# Patient Record
Sex: MALE | Race: WHITE | NOT HISPANIC OR LATINO | Employment: FULL TIME | URBAN - METROPOLITAN AREA
[De-identification: names, ages, dates, MRNs, and addresses within clinical notes are randomized per-mention and may not be internally consistent; named-entity substitution may affect disease eponyms.]

---

## 2023-10-17 ENCOUNTER — HOSPITAL ENCOUNTER (EMERGENCY)
Facility: HOSPITAL | Age: 33
Discharge: LEFT BEFORE DISCHARGE | End: 2023-10-17
Payer: COMMERCIAL

## 2023-10-17 VITALS
BODY MASS INDEX: 19.36 KG/M2 | WEIGHT: 130.73 LBS | DIASTOLIC BLOOD PRESSURE: 76 MMHG | SYSTOLIC BLOOD PRESSURE: 119 MMHG | TEMPERATURE: 97.2 F | OXYGEN SATURATION: 99 % | HEART RATE: 78 BPM | RESPIRATION RATE: 18 BRPM | HEIGHT: 69 IN

## 2023-10-17 PROCEDURE — 99211 OFF/OP EST MAY X REQ PHY/QHP: CPT

## 2024-01-25 ENCOUNTER — HOSPITAL ENCOUNTER (EMERGENCY)
Facility: HOSPITAL | Age: 34
Discharge: HOME | End: 2024-01-25
Attending: EMERGENCY MEDICINE
Payer: COMMERCIAL

## 2024-01-25 VITALS
TEMPERATURE: 98.5 F | BODY MASS INDEX: 21.18 KG/M2 | WEIGHT: 147.93 LBS | RESPIRATION RATE: 17 BRPM | HEART RATE: 69 BPM | OXYGEN SATURATION: 100 % | SYSTOLIC BLOOD PRESSURE: 128 MMHG | DIASTOLIC BLOOD PRESSURE: 93 MMHG | HEIGHT: 70 IN

## 2024-01-25 DIAGNOSIS — S01.311A LACERATION OF RIGHT EAR LOBE, INITIAL ENCOUNTER: Primary | ICD-10-CM

## 2024-01-25 PROCEDURE — 99282 EMERGENCY DEPT VISIT SF MDM: CPT

## 2024-01-25 NOTE — DISCHARGE INSTRUCTIONS
Sutures are absorbable.    Follow-up with plastic surgery as needed if wound is not healing or if you need revision.    Tylenol Motrin for pain.    Return for new or worsening symptoms

## 2024-01-25 NOTE — ED PROVIDER NOTES
Time: 1:55 AM EST  Date of encounter:  1/25/2024  Independent Historian/Clinical History and Information was obtained by:   Patient and Family    History is limited by: N/A    Chief Complaint: Ear laceration      History of Present Illness:  Patient is a 33 y.o. year old male who presents to the emergency department for evaluation of ear laceration.  Patient was trying to make his irrigate hole wider when the earlobe ripped apart at the bottom.  Patient is allergic to tetanus and cannot take it.  No other issues.  Patient is not on blood thinners    HPI    Patient Care Team  Primary Care Provider: Provider, Paulette Known    Past Medical History:     No Known Allergies  History reviewed. No pertinent past medical history.  Past Surgical History:   Procedure Laterality Date    FRACTURE SURGERY      HIP SURGERY Right      History reviewed. No pertinent family history.    Home Medications:  Prior to Admission medications    Medication Sig Start Date End Date Taking? Authorizing Provider   buprenorphine-naloxone (SUBOXONE) 8-2 MG film film PLACE 2 & 3/4 films UNDER THE TONGUE EVERY DAY 5/18/23   Juana Coronado MD   gabapentin (NEURONTIN) 600 MG tablet TAKE 1 TABLET BY MOUTH THREE TIMES DAILY. may take an additional TABLET AS NEEDED for uncontrolled pain 5/18/23   Juana Coronado MD   sertraline (ZOLOFT) 100 MG tablet Take 100 mg by mouth Daily.    Emergency, Nurse Brii, RN        Social History:   Social History     Tobacco Use    Smoking status: Every Day     Packs/day: 1     Types: Cigarettes    Smokeless tobacco: Never   Vaping Use    Vaping Use: Never used   Substance Use Topics    Alcohol use: Never    Drug use: Not Currently         Review of Systems:  Review of Systems   Skin:  Positive for wound (Earlobe laceration).   Neurological:  Negative for headaches.   Hematological: Negative.  Does not bruise/bleed easily.   Psychiatric/Behavioral: Negative.     All other systems reviewed and are negative.    "    Physical Exam:  /93   Pulse 69   Temp 98.5 °F (36.9 °C) (Oral)   Resp 17   Ht 177.8 cm (70\")   Wt 67.1 kg (147 lb 14.9 oz)   SpO2 100%   BMI 21.23 kg/m²     Physical Exam  Vitals and nursing note reviewed.   Constitutional:       Appearance: Normal appearance.   HENT:      Head: Atraumatic.      Right Ear: Tympanic membrane and ear canal normal.      Ears:      Comments: Opening of right earlobe hole is about nickel sized from being gauged out over time.  The distal aspect of the skin has split in half.  It is approximately 5 mm thick     Nose: Nose normal.   Eyes:      Conjunctiva/sclera: Conjunctivae normal.   Pulmonary:      Effort: Pulmonary effort is normal.   Musculoskeletal:         General: Normal range of motion.      Cervical back: Normal range of motion.   Skin:     Comments: Distal part of gauged earlobe is split in the middle.  Mild bleeding   Neurological:      General: No focal deficit present.      Mental Status: He is alert.   Psychiatric:         Mood and Affect: Mood normal.         Behavior: Behavior normal.                Procedures:  Laceration Repair    Date/Time: 1/25/2024 2:03 AM    Performed by: Sherrie Person APRN  Authorized by: Mendoza Randolph MD    Consent:     Consent obtained:  Verbal    Consent given by:  Patient    Risks, benefits, and alternatives were discussed: yes      Risks discussed:  Infection, pain, poor cosmetic result, poor wound healing and need for additional repair    Alternatives discussed:  No treatment  Universal protocol:     Procedure explained and questions answered to patient or proxy's satisfaction: yes      Patient identity confirmed:  Verbally with patient  Anesthesia:     Anesthesia method:  Local infiltration    Local anesthetic:  Lidocaine 1% w/o epi  Laceration details:     Location:  Ear    Ear location:  R ear    Length (cm):  0.5    Depth (mm):  0.5 (Entire lower aspect of earlobe split through)  Pre-procedure details:     Preparation:  " Patient was prepped and draped in usual sterile fashion  Exploration:     Imaging outcome: foreign body not noted      Wound exploration: entire depth of wound visualized      Contaminated: no    Treatment:     Area cleansed with:  Povidone-iodine    Amount of cleaning:  Standard    Irrigation solution:  Sterile saline    Irrigation volume:  50    Irrigation method:  Syringe  Skin repair:     Repair method:  Sutures    Suture size:  4-0    Suture material:  Fast-absorbing gut    Suture technique:  Simple interrupted    Number of sutures:  4 (Circumferential at each aspect to tack 2 pieces back together)  Approximation:     Approximation:  Close  Repair type:     Repair type:  Simple  Post-procedure details:     Dressing:  Sterile dressing    Procedure completion:  Tolerated well, no immediate complications        Medical Decision Making:      Comorbidities that affect care:    Smoking    External Notes reviewed:    Previous ED Note: Last visit was back in October for headache      The following orders were placed and all results were independently analyzed by me:  Orders Placed This Encounter   Procedures    Laceration Repair       Medications Given in the Emergency Department:  Medications - No data to display     ED Course:         Labs:    Lab Results (last 24 hours)       ** No results found for the last 24 hours. **             Imaging:    No Radiology Exams Resulted Within Past 24 Hours      Differential Diagnosis and Discussion:    Laceration: Laceration was evaluated for arterial injury, ligamentous damage, and other neurovascular injury.        MDM  Number of Diagnoses or Management Options  Laceration of right ear lobe, initial encounter  Diagnosis management comments: The patient presented with a laceration in need of repair. See laceration repair note for details. The wound was irrigated with normal saline irrigation. 4 absorbable sutures were used to approximate the wound edges. Tetanus was not given.   Patient states he is allergic the patient tolerated the procedure well. Acute bleeding has ceased and the wound was approximated in the emergency department. Patient was counseled to keep the wound clean, dry, and out of the sun. Patient was counseled to change dressings daily. Patient was advised to return to the ED for worsening erythema, pain, swelling, fever, excessive drainage or signs of infection. They were counseled to follow up for suture removal as described in the discharge instructions. Patient verbalizes understanding and agrees to follow up as instructed.       Amount and/or Complexity of Data Reviewed  Tests in the medicine section of CPT®: ordered and reviewed    Risk of Complications, Morbidity, and/or Mortality  Presenting problems: low  Management options: low    Patient Progress  Patient progress: stable           Patient Care Considerations:    ANTIBIOTICS: I considered prescribing antibiotics as an outpatient however no bacterial focus of infection was found.      Consultants/Shared Management Plan:    None    Social Determinants of Health:    Patient has presented with family members who are responsible, reliable and will ensure follow up care.      Disposition and Care Coordination:    Discharged: The patient is suitable and stable for discharge with no need for consideration of admission.    I have explained the patient´s condition, diagnoses and treatment plan based on the information available to me at this time. I have answered questions and addressed any concerns. The patient has a good  understanding of the patient´s diagnosis, condition, and treatment plan as can be expected at this point. The vital signs have been stable. The patient´s condition is stable and appropriate for discharge from the emergency department.      The patient will pursue further outpatient evaluation with the primary care physician or other designated or consulting physician as outlined in the discharge  instructions. They are agreeable to this plan of care and follow-up instructions have been explained in detail. The patient has received these instructions in written format and have expressed an understanding of the discharge instructions. The patient is aware that any significant change in condition or worsening of symptoms should prompt an immediate return to this or the closest emergency department or call to 911.    Final diagnoses:   Laceration of right ear lobe, initial encounter        ED Disposition       ED Disposition   Discharge    Condition   Stable    Comment   --               This medical record created using voice recognition software.             Sherrie Person, APRN  01/25/24 [id number]

## 2024-02-16 ENCOUNTER — HOSPITAL ENCOUNTER (OUTPATIENT)
Facility: HOSPITAL | Age: 34
Setting detail: OBSERVATION
Discharge: HOME | End: 2024-02-17
Attending: EMERGENCY MEDICINE | Admitting: INTERNAL MEDICINE
Payer: COMMERCIAL

## 2024-02-16 ENCOUNTER — APPOINTMENT (OUTPATIENT)
Dept: MRI IMAGING | Facility: HOSPITAL | Age: 34
End: 2024-02-16
Payer: COMMERCIAL

## 2024-02-16 ENCOUNTER — APPOINTMENT (OUTPATIENT)
Dept: CT IMAGING | Facility: HOSPITAL | Age: 34
End: 2024-02-16
Payer: COMMERCIAL

## 2024-02-16 DIAGNOSIS — R56.9 SEIZURE: Primary | ICD-10-CM

## 2024-02-16 LAB
ALBUMIN SERPL-MCNC: 4.6 G/DL (ref 3.5–5.2)
ALBUMIN/GLOB SERPL: 1.6 G/DL
ALP SERPL-CCNC: 97 U/L (ref 39–117)
ALT SERPL W P-5'-P-CCNC: 12 U/L (ref 1–41)
ANION GAP SERPL CALCULATED.3IONS-SCNC: 20.7 MMOL/L (ref 5–15)
AST SERPL-CCNC: 17 U/L (ref 1–40)
BASOPHILS # BLD AUTO: 0.02 10*3/MM3 (ref 0–0.2)
BASOPHILS NFR BLD AUTO: 0.1 % (ref 0–1.5)
BILIRUB SERPL-MCNC: 0.2 MG/DL (ref 0–1.2)
BUN SERPL-MCNC: 18 MG/DL (ref 6–20)
BUN/CREAT SERPL: 17.5 (ref 7–25)
CALCIUM SPEC-SCNC: 10.4 MG/DL (ref 8.6–10.5)
CHLORIDE SERPL-SCNC: 98 MMOL/L (ref 98–107)
CK SERPL-CCNC: 85 U/L (ref 20–200)
CO2 SERPL-SCNC: 18.3 MMOL/L (ref 22–29)
CREAT SERPL-MCNC: 1.03 MG/DL (ref 0.76–1.27)
DEPRECATED RDW RBC AUTO: 44.8 FL (ref 37–54)
EGFRCR SERPLBLD CKD-EPI 2021: 97.1 ML/MIN/1.73
EOSINOPHIL # BLD AUTO: 0.09 10*3/MM3 (ref 0–0.4)
EOSINOPHIL NFR BLD AUTO: 0.7 % (ref 0.3–6.2)
ERYTHROCYTE [DISTWIDTH] IN BLOOD BY AUTOMATED COUNT: 12.5 % (ref 12.3–15.4)
GLOBULIN UR ELPH-MCNC: 2.8 GM/DL
GLUCOSE BLDC GLUCOMTR-MCNC: 160 MG/DL (ref 70–99)
GLUCOSE SERPL-MCNC: 181 MG/DL (ref 65–99)
HCT VFR BLD AUTO: 42.1 % (ref 37.5–51)
HGB BLD-MCNC: 14.2 G/DL (ref 13–17.7)
HOLD SPECIMEN: 11
HOLD SPECIMEN: 11
IMM GRANULOCYTES # BLD AUTO: 0.07 10*3/MM3 (ref 0–0.05)
IMM GRANULOCYTES NFR BLD AUTO: 0.5 % (ref 0–0.5)
LYMPHOCYTES # BLD AUTO: 2.08 10*3/MM3 (ref 0.7–3.1)
LYMPHOCYTES NFR BLD AUTO: 15.1 % (ref 19.6–45.3)
MAGNESIUM SERPL-MCNC: 2.6 MG/DL (ref 1.6–2.6)
MCH RBC QN AUTO: 33.4 PG (ref 26.6–33)
MCHC RBC AUTO-ENTMCNC: 33.7 G/DL (ref 31.5–35.7)
MCV RBC AUTO: 99.1 FL (ref 79–97)
MONOCYTES # BLD AUTO: 1.07 10*3/MM3 (ref 0.1–0.9)
MONOCYTES NFR BLD AUTO: 7.8 % (ref 5–12)
NEUTROPHILS NFR BLD AUTO: 10.4 10*3/MM3 (ref 1.7–7)
NEUTROPHILS NFR BLD AUTO: 75.8 % (ref 42.7–76)
NRBC BLD AUTO-RTO: 0 /100 WBC (ref 0–0.2)
PLATELET # BLD AUTO: 256 10*3/MM3 (ref 140–450)
PMV BLD AUTO: 9 FL (ref 6–12)
POTASSIUM SERPL-SCNC: 3.5 MMOL/L (ref 3.5–5.2)
PROT SERPL-MCNC: 7.4 G/DL (ref 6–8.5)
RBC # BLD AUTO: 4.25 10*6/MM3 (ref 4.14–5.8)
SODIUM SERPL-SCNC: 137 MMOL/L (ref 136–145)
TSH SERPL DL<=0.05 MIU/L-ACNC: 2.37 UIU/ML (ref 0.27–4.2)
WBC NRBC COR # BLD AUTO: 13.73 10*3/MM3 (ref 3.4–10.8)
WHOLE BLOOD HOLD COAG: 11
WHOLE BLOOD HOLD SPECIMEN: 11

## 2024-02-16 PROCEDURE — 99222 1ST HOSP IP/OBS MODERATE 55: CPT | Performed by: INTERNAL MEDICINE

## 2024-02-16 PROCEDURE — G0378 HOSPITAL OBSERVATION PER HR: HCPCS

## 2024-02-16 PROCEDURE — 25810000003 LACTATED RINGERS SOLUTION: Performed by: EMERGENCY MEDICINE

## 2024-02-16 PROCEDURE — 82550 ASSAY OF CK (CPK): CPT | Performed by: EMERGENCY MEDICINE

## 2024-02-16 PROCEDURE — 80050 GENERAL HEALTH PANEL: CPT | Performed by: EMERGENCY MEDICINE

## 2024-02-16 PROCEDURE — 0 GADOBENATE DIMEGLUMINE 529 MG/ML SOLUTION: Performed by: INTERNAL MEDICINE

## 2024-02-16 PROCEDURE — 99291 CRITICAL CARE FIRST HOUR: CPT

## 2024-02-16 PROCEDURE — 25010000002 LEVETIRACETAM IN NACL 0.75% 1000 MG/100ML SOLUTION: Performed by: EMERGENCY MEDICINE

## 2024-02-16 PROCEDURE — A9577 INJ MULTIHANCE: HCPCS | Performed by: INTERNAL MEDICINE

## 2024-02-16 PROCEDURE — 70450 CT HEAD/BRAIN W/O DYE: CPT

## 2024-02-16 PROCEDURE — 82948 REAGENT STRIP/BLOOD GLUCOSE: CPT

## 2024-02-16 PROCEDURE — 96374 THER/PROPH/DIAG INJ IV PUSH: CPT

## 2024-02-16 PROCEDURE — 99204 OFFICE O/P NEW MOD 45 MIN: CPT

## 2024-02-16 PROCEDURE — 83735 ASSAY OF MAGNESIUM: CPT | Performed by: EMERGENCY MEDICINE

## 2024-02-16 PROCEDURE — 70553 MRI BRAIN STEM W/O & W/DYE: CPT

## 2024-02-16 RX ORDER — OXCARBAZEPINE 600 MG/1
1 TABLET, FILM COATED ORAL EVERY 12 HOURS SCHEDULED
COMMUNITY
Start: 2024-02-08

## 2024-02-16 RX ORDER — HYDROCODONE BITARTRATE AND ACETAMINOPHEN 5; 325 MG/1; MG/1
1 TABLET ORAL EVERY 6 HOURS PRN
COMMUNITY
Start: 2024-02-13 | End: 2024-02-17 | Stop reason: HOSPADM

## 2024-02-16 RX ORDER — NITROGLYCERIN 0.4 MG/1
0.4 TABLET SUBLINGUAL
Status: DISCONTINUED | OUTPATIENT
Start: 2024-02-16 | End: 2024-02-17 | Stop reason: HOSPADM

## 2024-02-16 RX ORDER — ENOXAPARIN SODIUM 100 MG/ML
40 INJECTION SUBCUTANEOUS EVERY 24 HOURS
Status: DISCONTINUED | OUTPATIENT
Start: 2024-02-16 | End: 2024-02-17 | Stop reason: HOSPADM

## 2024-02-16 RX ORDER — SODIUM CHLORIDE 0.9 % (FLUSH) 0.9 %
10 SYRINGE (ML) INJECTION AS NEEDED
Status: DISCONTINUED | OUTPATIENT
Start: 2024-02-16 | End: 2024-02-17 | Stop reason: HOSPADM

## 2024-02-16 RX ORDER — BUPRENORPHINE HYDROCHLORIDE AND NALOXONE HYDROCHLORIDE DIHYDRATE 8; 2 MG/1; MG/1
1 TABLET SUBLINGUAL 2 TIMES DAILY
Status: DISCONTINUED | OUTPATIENT
Start: 2024-02-16 | End: 2024-02-17 | Stop reason: HOSPADM

## 2024-02-16 RX ORDER — OXCARBAZEPINE 300 MG/1
600 TABLET, FILM COATED ORAL EVERY 12 HOURS SCHEDULED
Status: DISCONTINUED | OUTPATIENT
Start: 2024-02-16 | End: 2024-02-17 | Stop reason: HOSPADM

## 2024-02-16 RX ORDER — BISACODYL 10 MG
10 SUPPOSITORY, RECTAL RECTAL DAILY PRN
Status: DISCONTINUED | OUTPATIENT
Start: 2024-02-16 | End: 2024-02-17 | Stop reason: HOSPADM

## 2024-02-16 RX ORDER — ACETAMINOPHEN 325 MG/1
650 TABLET ORAL EVERY 6 HOURS PRN
COMMUNITY

## 2024-02-16 RX ORDER — LEVETIRACETAM 500 MG/1
500 TABLET ORAL EVERY 12 HOURS SCHEDULED
Status: DISCONTINUED | OUTPATIENT
Start: 2024-02-16 | End: 2024-02-17 | Stop reason: HOSPADM

## 2024-02-16 RX ORDER — IBUPROFEN 400 MG/1
800 TABLET ORAL EVERY 8 HOURS PRN
Status: DISCONTINUED | OUTPATIENT
Start: 2024-02-16 | End: 2024-02-17 | Stop reason: HOSPADM

## 2024-02-16 RX ORDER — ACETAMINOPHEN 500 MG
1000 TABLET ORAL EVERY 8 HOURS PRN
Status: DISCONTINUED | OUTPATIENT
Start: 2024-02-16 | End: 2024-02-17 | Stop reason: HOSPADM

## 2024-02-16 RX ORDER — POLYETHYLENE GLYCOL 3350 17 G/17G
17 POWDER, FOR SOLUTION ORAL DAILY PRN
Status: DISCONTINUED | OUTPATIENT
Start: 2024-02-16 | End: 2024-02-17 | Stop reason: HOSPADM

## 2024-02-16 RX ORDER — CHOLECALCIFEROL (VITAMIN D3) 125 MCG
5 CAPSULE ORAL NIGHTLY PRN
Status: DISCONTINUED | OUTPATIENT
Start: 2024-02-16 | End: 2024-02-17 | Stop reason: HOSPADM

## 2024-02-16 RX ORDER — IBUPROFEN 800 MG/1
800 TABLET ORAL EVERY 8 HOURS PRN
COMMUNITY
Start: 2024-02-13

## 2024-02-16 RX ORDER — AMOXICILLIN 250 MG
2 CAPSULE ORAL 2 TIMES DAILY PRN
Status: DISCONTINUED | OUTPATIENT
Start: 2024-02-16 | End: 2024-02-17 | Stop reason: HOSPADM

## 2024-02-16 RX ORDER — GABAPENTIN 400 MG/1
800 CAPSULE ORAL EVERY 8 HOURS SCHEDULED
Status: DISCONTINUED | OUTPATIENT
Start: 2024-02-16 | End: 2024-02-17 | Stop reason: HOSPADM

## 2024-02-16 RX ORDER — BISACODYL 5 MG/1
5 TABLET, DELAYED RELEASE ORAL DAILY PRN
Status: DISCONTINUED | OUTPATIENT
Start: 2024-02-16 | End: 2024-02-17 | Stop reason: HOSPADM

## 2024-02-16 RX ORDER — AZITHROMYCIN 250 MG/1
250 TABLET, FILM COATED ORAL TAKE AS DIRECTED
COMMUNITY
Start: 2024-02-13

## 2024-02-16 RX ORDER — ONDANSETRON 4 MG/1
4 TABLET, ORALLY DISINTEGRATING ORAL EVERY 6 HOURS PRN
Status: DISCONTINUED | OUTPATIENT
Start: 2024-02-16 | End: 2024-02-17 | Stop reason: HOSPADM

## 2024-02-16 RX ORDER — SODIUM CHLORIDE 9 MG/ML
40 INJECTION, SOLUTION INTRAVENOUS AS NEEDED
Status: DISCONTINUED | OUTPATIENT
Start: 2024-02-16 | End: 2024-02-17 | Stop reason: HOSPADM

## 2024-02-16 RX ORDER — SODIUM CHLORIDE 0.9 % (FLUSH) 0.9 %
10 SYRINGE (ML) INJECTION EVERY 12 HOURS SCHEDULED
Status: DISCONTINUED | OUTPATIENT
Start: 2024-02-16 | End: 2024-02-17 | Stop reason: HOSPADM

## 2024-02-16 RX ORDER — LEVETIRACETAM 10 MG/ML
1000 INJECTION INTRAVASCULAR ONCE
Status: COMPLETED | OUTPATIENT
Start: 2024-02-16 | End: 2024-02-16

## 2024-02-16 RX ADMIN — IBUPROFEN 800 MG: 400 TABLET ORAL at 21:12

## 2024-02-16 RX ADMIN — Medication 10 ML: at 20:44

## 2024-02-16 RX ADMIN — SODIUM CHLORIDE, POTASSIUM CHLORIDE, SODIUM LACTATE AND CALCIUM CHLORIDE 500 ML: 600; 310; 30; 20 INJECTION, SOLUTION INTRAVENOUS at 11:07

## 2024-02-16 RX ADMIN — OXCARBAZEPINE 600 MG: 300 TABLET, FILM COATED ORAL at 20:45

## 2024-02-16 RX ADMIN — GABAPENTIN 800 MG: 400 CAPSULE ORAL at 21:12

## 2024-02-16 RX ADMIN — GADOBENATE DIMEGLUMINE 10 ML: 529 INJECTION, SOLUTION INTRAVENOUS at 19:33

## 2024-02-16 RX ADMIN — BUPRENORPHINE AND NALOXONE 1 TABLET: 8; 2 TABLET SUBLINGUAL at 20:45

## 2024-02-16 RX ADMIN — LEVETIRACETAM 500 MG: 500 TABLET, FILM COATED ORAL at 20:45

## 2024-02-16 RX ADMIN — LEVETIRACETAM 1000 MG: 10 INJECTION, SOLUTION INTRAVENOUS at 11:07

## 2024-02-16 NOTE — H&P
[facility]   HOSPITALIST HISTORY AND PHYSICAL  Date: 2024   Patient Name: Christian Hercules  : 1990  MRN: [id number]  Primary Care Physician:  Provider, No Known  Date of admission: 2024    Subjective   Subjective     Chief Complaint: New onset seizure    HPI:    Christian Hercules is a 33 y.o. male with past medical history significant for bipolar disorder, previous substance abuse who has been sober for 3 years on Suboxone and chronic pain on gabapentin who presents emergency department after he was witnessed by his girlfriend to have had a seizure.  She is currently not available to describe that event but per the documentation it was reported as a tonic-clonic seizure with foaming at the mouth and apnea.  EMS was called the patient was brought to the emergency department.  On arrival to the ED he was noted to be postictal.  While in the ED the patient underwent a CT of the brain and while in CT he had a witnessed generalized tonic-clonic seizure with subsequent postictal state.  He received 1 g of IV Keppra.  Teleneurology was consulted from the emergency department and recommendations were made to initiate the patient on Keppra 500 mg twice daily and admission to the hospitalist service for further valuation.    The patient does not recall the events which led to his admission.  He denies any previous history of seizures.  He denies any headache no recent visual changes no fevers no chills.      Personal History     Past Medical History:  Substance abuse history currently on Suboxone  Chronic pain  Bipolar disorder    Past Surgical History:  Past Surgical History:   Procedure Laterality Date    FRACTURE SURGERY      HIP SURGERY Right        Family History:   History reviewed. No pertinent family history.  No history of seizures.    Social History:   Social History     Socioeconomic History    Marital status: Single   Tobacco Use    Smoking status: Every Day     Packs/day: 1     Types: Cigarettes     Smokeless tobacco: Never   Vaping Use    Vaping Use: Never used   Substance and Sexual Activity    Alcohol use: Never    Drug use: Not Currently       Home Medications:  buprenorphine-naloxone, gabapentin, and sertraline    Allergies:  No Known Allergies    Review of Systems   All systems were reviewed and negative except for: As noted in PI.    Objective   Objective     Vitals:   Temp:  [97.9 °F (36.6 °C)] 97.9 °F (36.6 °C)  Heart Rate:  [75-93] 76  Resp:  [16] 16  BP: (108-124)/(52-70) 124/67    Physical Exam    Constitutional: Awake, alert, no acute distress   Eyes: Pupils equally round and reactive, sclerae anicteric, no conjunctival injection   HENT: NCAT, mucous membranes moist   Neck: Supple, no thyromegaly, no lymphadenopathy, trachea midline   Respiratory: Clear to auscultation bilaterally, nonlabored respirations    Cardiovascular: RRR, no appreciable murmurs, palpable pedal pulses bilaterally   Gastrointestinal: Positive bowel sounds, soft, nontender, nondistended   Musculoskeletal: No bilateral ankle edema, no clubbing or cyanosis to extremities   Psychiatric: Appropriate affect, cooperative   Neurologic: Oriented x 3, no extremities equally-no focal weakness, Cranial Nerves grossly intact to confrontation, speech clear   Skin: No rashes     Result Review    Result Review:  I have personally reviewed the results from the time of this admission to 2/16/2024 14:51 EST and agree with these findings:  [x]  Laboratory  []  Microbiology  [x]  Radiology  []  EKG/Telemetry   []  Cardiology/Vascular   []  Pathology  []  Old records  []  Other:      Assessment & Plan   Assessment / Plan     Assessment/Plan:   New-onset seizure  History of substance abuse  Chronic pain syndrome  Bipolar disorder    Admit to the hospitalist service for further evaluation.  Continue Keppra 500 mg p.o. twice daily as per recommendations of neurology.  We will obtain an MRI of the brain for further evaluation.  Patient will need  outpatient follow-up with neurology and outpatient EEG arranged for further evaluation as well.  Seizure precautions have been stressed to the patient and his mother who is at bedside.    DVT prophylaxis:  Medical DVT prophylaxis orders are present.        CODE STATUS:    Code Status (Patient has no pulse and is not breathing): CPR (Attempt to Resuscitate)  Medical Interventions (Patient has pulse or is breathing): Full Support      Admission Status:  I believe this patient meets observation status.    Electronically signed by Lucrecia Navarro MD, 02/16/24, 2:51 PM EST.

## 2024-02-16 NOTE — PAYOR COMM NOTE
"Elaine Nagy (33 y.o. Male)     PATIENT INFORMATION  Name:  Elaine Nagy  MRN#:     [id number]  :  1990       ADMISSION INFORMATION  CLASS: Observation   DOS:  24      CURRENT ATTENDING PROVIDER INFORMATION  Name/NPI: Lucrecia Navarro MD (NPI: [id number])   Phone:  Phone: [phone]      RENDERING FACILITY  Name:  [facility]   NPI:  [id number]  TID:  [id number]  Address:      [facility] [address] [facility]  Phone  [phone]    UTILIZATION REVIEW CONTACT INFORMATION  Phone:      [phone]  Fax:           [phone]      ADMISSION DIAGNOSIS  Seizure [R56.9]  ++++++++++++++++++++++++++++++++++++++++++++++++++++++++++++++++++++++++++++++++        Date of Birth   1990    Social Security Number       Address   [address] [address]    Home Phone   [phone]    MRN   [id number]       [employer]    Marital Status   Single                            Admission Date   24    Admission Type   Emergency    Admitting Provider       Attending Provider   Kade Liu DO    Department, Room/Bed   [facility] EMERGENCY ROOM,        Discharge Date       Discharge Disposition       Discharge Destination                                 Attending Provider: Kade Liu DO    Allergies: No Known Allergies    Isolation: None   Infection: None   Code Status: CPR    Ht: 177.8 cm (70\")   Wt: 66 kg (145 lb 8.1 oz)    Admission Cmt: None   Principal Problem: Seizure [R56.9]                   Active Insurance as of 2024       Primary Coverage       Payor Plan Insurance Group Employer/Plan Group    [employer] [place] [employer] MEDICA[place]        Payor Plan Address Payor Plan Phone Number Payor Plan Fax Number Effective Dates    [address] [address] [phone]  2023 - None Entered    [facility] [id number]         Subscriber Name Subscriber Birth Date Member ID       ELAINE NAGY 1990 [id number]                      Seizure [facility] In[facility]     " "  Indications Met   Last updated by Viky Oliver RN on 2/16/2024 1602     Review Status Created By   Primary Completed Viky Oliver RN      Criteria Review   Seizure [facility] In[facility]     Overall Determination: Indications Met     Criteria:  [×] Admission is indicated for  1 or more  of the following :      [×] Recurrent seizure (ie, during emergency department or observation care) and  1 or more  of the following :          [×] Patient not known to have seizure disorder              2/16/2024  4:02 PM                  -- 2/16/2024  4:02 PM by Viky Oliver, MALI --                      No previous hx of seizures.                       Sig Other witnessed tonic/clonic sz lasting approx 2 min. EMS reports pt post-ictal when they arrived.                       + Tonic clonic sz again while in CT. Witnessed by hospital staff.     Notes:  -- 2/16/2024  4:02 PM by Viky Oliver RN --      To ED via EMS s/p seizure. No previous hx of seizures. Was witnessed by girlfriend. Lasted approx 2 minutes. Was \"foaming at the mouth\" & period of apnea. EMS responded & reports pt post-ictal on their arrival.       Had 2nd seizure while in CT. Witnessed by hospital staff. generalized tonic clonic seizure w/subsequent post-ictal state.       Pt denies previous hx of seizure. Denies headache or visual changes. No fever or chills.                   PMHx: JODY (on suboxone); Bipolar disorder. Hip surgery.                   ED results: CTH: No acute intracranial findings. Ac on Chronic paranasal sinus disease.       Anion gap 20.7; serum CO2 18.3; WBC 13.73.                   In ED: Keppra 1gm IV; IV LR 500ml bolus.                   Admit: Consult Neurology; Seizure precautions; O2; MRI brain; UDS; CBC in am; Keppra PO q12h; Lovenox SQ qd;.                                  History & Physical        Lucrecia Navarro MD at 02/16/24 [facility]           [facility] HISTORY AND PHYSICAL  Date: 2/16/2024   Patient Name: " Christian Hercules  : 1990  MRN: [id number]  Primary Care Physician:  Provider, No Known  Date of admission: 2024    Subjective  Subjective     Chief Complaint: New onset seizure    HPI:    Christian Hercules is a 33 y.o. male with past medical history significant for bipolar disorder, previous substance abuse who has been sober for 3 years on Suboxone and chronic pain on gabapentin who presents emergency department after he was witnessed by his girlfriend to have had a seizure.  She is currently not available to describe that event but per the documentation it was reported as a tonic-clonic seizure with foaming at the mouth and apnea.  EMS was called the patient was brought to the emergency department.  On arrival to the ED he was noted to be postictal.  While in the ED the patient underwent a CT of the brain and while in CT he had a witnessed generalized tonic-clonic seizure with subsequent postictal state.  He received 1 g of IV Keppra.  Teleneurology was consulted from the emergency department and recommendations were made to initiate the patient on Keppra 500 mg twice daily and admission to the hospitalist service for further valuation.    The patient does not recall the events which led to his admission.  He denies any previous history of seizures.  He denies any headache no recent visual changes no fevers no chills.      Personal History     Past Medical History:  Substance abuse history currently on Suboxone  Chronic pain  Bipolar disorder    Past Surgical History:  Past Surgical History:   Procedure Laterality Date    FRACTURE SURGERY      HIP SURGERY Right        Family History:   History reviewed. No pertinent family history.  No history of seizures.    Social History:   Social History     Socioeconomic History    Marital status: Single   Tobacco Use    Smoking status: Every Day     Packs/day: 1     Types: Cigarettes    Smokeless tobacco: Never   Vaping Use    Vaping Use: Never used   Substance and Sexual  Activity    Alcohol use: Never    Drug use: Not Currently       Home Medications:  buprenorphine-naloxone, gabapentin, and sertraline    Allergies:  No Known Allergies    Review of Systems   All systems were reviewed and negative except for: As noted in PI.    Objective  Objective     Vitals:   Temp:  [97.9 °F (36.6 °C)] 97.9 °F (36.6 °C)  Heart Rate:  [75-93] 76  Resp:  [16] 16  BP: (108-124)/(52-70) 124/67    Physical Exam    Constitutional: Awake, alert, no acute distress   Eyes: Pupils equally round and reactive, sclerae anicteric, no conjunctival injection   HENT: NCAT, mucous membranes moist   Neck: Supple, no thyromegaly, no lymphadenopathy, trachea midline   Respiratory: Clear to auscultation bilaterally, nonlabored respirations    Cardiovascular: RRR, no appreciable murmurs, palpable pedal pulses bilaterally   Gastrointestinal: Positive bowel sounds, soft, nontender, nondistended   Musculoskeletal: No bilateral ankle edema, no clubbing or cyanosis to extremities   Psychiatric: Appropriate affect, cooperative   Neurologic: Oriented x 3, no extremities equally-no focal weakness, Cranial Nerves grossly intact to confrontation, speech clear   Skin: No rashes     Result Review   Result Review:  I have personally reviewed the results from the time of this admission to 2/16/2024 14:51 EST and agree with these findings:  [x]  Laboratory  []  Microbiology  [x]  Radiology  []  EKG/Telemetry   []  Cardiology/Vascular   []  Pathology  []  Old records  []  Other:      Assessment & Plan  Assessment / Plan     Assessment/Plan:   New-onset seizure  History of substance abuse  Chronic pain syndrome  Bipolar disorder    Admit to the hospitalist service for further evaluation.  Continue Keppra 500 mg p.o. twice daily as per recommendations of neurology.  We will obtain an MRI of the brain for further evaluation.  Patient will need outpatient follow-up with neurology and outpatient EEG arranged for further evaluation as  well.  Seizure precautions have been stressed to the patient and his mother who is at bedside.    DVT prophylaxis:  Medical DVT prophylaxis orders are present.        CODE STATUS:    Code Status (Patient has no pulse and is not breathing): CPR (Attempt to Resuscitate)  Medical Interventions (Patient has pulse or is breathing): Full Support      Admission Status:  I believe this patient meets observation status.    Electronically signed by Lucrecia Navarro MD, 02/16/24, 2:51 PM EST.             Electronically signed by Lucrecia Navarro MD at 02/16/24 1502       Current Facility-Administered Medications   Medication Dose Route Frequency Provider Last Rate Last Admin    acetaminophen (TYLENOL) tablet 1,000 mg  1,000 mg Oral Q8H PRN Lucrecia Navarro MD        sennosides-docusate (PERICOLACE) 8.6-50 MG per tablet 2 tablet  2 tablet Oral BID PRN Lucrecia Navarro MD        And    polyethylene glycol (MIRALAX) packet 17 g  17 g Oral Daily PRN Lucrecia Navarro MD        And    bisacodyl (DULCOLAX) EC tablet 5 mg  5 mg Oral Daily PRN Lucrecia Navarro MD        And    bisacodyl (DULCOLAX) suppository 10 mg  10 mg Rectal Daily PRN Lucrecia Navarro MD        Enoxaparin Sodium (LOVENOX) syringe 40 mg  40 mg Subcutaneous Q24H Lucrecia Navarro MD        levETIRAcetam (KEPPRA) tablet 500 mg  500 mg Oral Q12H Lucrecia Navarro MD        melatonin tablet 5 mg  5 mg Oral Nightly PRN Lucrecia Navarro MD        nitroglycerin (NITROSTAT) SL tablet 0.4 mg  0.4 mg Sublingual Q5 Min PRN Lucrecia Navarro MD        ondansetron ODT (ZOFRAN-ODT) disintegrating tablet 4 mg  4 mg Oral Q6H PRN Lucrecia Navarro MD        sodium chloride 0.9 % flush 10 mL  10 mL Intravenous PRN Kade Liu DO        sodium chloride 0.9 % flush 10 mL  10 mL Intravenous Q12H Lucrecia Navarro MD        sodium chloride 0.9 % flush 10 mL  10 mL Intravenous PRN Lucrecia Navarro,  MD        sodium chloride 0.9 % infusion 40 mL  40 mL Intravenous PRN Lucrecia Navarro MD         Current Outpatient Medications   Medication Sig Dispense Refill    acetaminophen (TYLENOL) 325 MG tablet Take 2 tablets by mouth Every 6 (Six) Hours As Needed for Mild Pain.      azithromycin (ZITHROMAX) 250 MG tablet Take 1 tablet by mouth Take As Directed. TAKE 2 TABLETS BY MOUTH ON DAY 1, THEN TAKE 1 TABLET DAILY ON DAYS 2 TO 5      buprenorphine-naloxone (SUBOXONE) 8-2 MG film film Place 2.5 films under the tongue Daily.      gabapentin (NEURONTIN) 800 MG tablet Take 1 tablet by mouth 3 (Three) Times a Day.      HYDROcodone-acetaminophen (NORCO) 5-325 MG per tablet Take 1 tablet by mouth Every 6 (Six) Hours As Needed for Moderate Pain. for pain      ibuprofen (ADVIL,MOTRIN) 800 MG tablet Take 1 tablet by mouth Every 8 (Eight) Hours As Needed for Mild Pain.      OXcarbazepine (TRILEPTAL) 600 MG tablet Take 1 tablet by mouth Every 12 (Twelve) Hours.       Lab Results (last 24 hours)       Procedure Component Value Units Date/Time    POC Glucose Once [[id number]]  (Abnormal) Collected: 02/16/24 1050    Specimen: Blood Updated: 02/16/24 1052     Glucose 160 mg/dL      Comment: Serial Number: [id number]:  [id number]       TSH [[id number]]  (Normal) Collected: 02/16/24 0944    Specimen: Blood Updated: 02/16/24 1036     TSH 2.370 uIU/mL     Magnesium [[id number]]  (Normal) Collected: 02/16/24 0944    Specimen: Blood Updated: 02/16/24 1029     Magnesium 2.6 mg/dL     CK [[id number]]  (Normal) Collected: 02/16/24 0944    Specimen: Blood Updated: 02/16/24 1029     Creatine Kinase 85 U/L     Comprehensive Metabolic Panel [[id number]]  (Abnormal) Collected: 02/16/24 0944    Specimen: Blood Updated: 02/16/24 1013     Glucose 181 mg/dL      BUN 18 mg/dL      Creatinine 1.03 mg/dL      Sodium 137 mmol/L      Potassium 3.5 mmol/L      Chloride 98 mmol/L      CO2 18.3 mmol/L      Calcium 10.4 mg/dL      Total Protein 7.4  g/dL      Albumin 4.6 g/dL      ALT (SGPT) 12 U/L      AST (SGOT) 17 U/L      Alkaline Phosphatase 97 U/L      Total Bilirubin 0.2 mg/dL      Globulin 2.8 gm/dL      A/G Ratio 1.6 g/dL      BUN/Creatinine Ratio 17.5     Anion Gap 20.7 mmol/L      eGFR 97.1 mL/min/1.73     Narrative:      GFR Normal >60  Chronic Kidney Disease <60  Kidney Failure <15      [place] Draw [[id number]] Collected: 02/16/24 0944    Specimen: Blood Updated: 02/16/24 0953    Narrative:      The following orders were created for panel order [place] Draw.  Procedure                               Abnormality         Status                     ---------                               -----------         ------                     Green Top (Gel)[[id number]]                                  Final result               Lavender Top[[id number]]                                     Final result               Gold Top - SST[[id number]]                                   Final result               Light Blue Top[[id number]]                                   Final result                 Please view results for these tests on the individual orders.    Gold Top - SST [[id number]] Collected: 02/16/24 0944    Specimen: Blood Updated: 02/16/24 0953     Extra Tube 11    Green Top (Gel) [[id number]] Collected: 02/16/24 0944    Specimen: Blood Updated: 02/16/24 0953     Extra Tube 11    Light Blue Top [[id number]] Collected: 02/16/24 0944    Specimen: Blood Updated: 02/16/24 0953     Extra Tube 11    Lavender Top [[id number]] Collected: 02/16/24 0944    Specimen: Blood Updated: 02/16/24 0952     Extra Tube 11    CBC & Differential [[id number]]  (Abnormal) Collected: 02/16/24 0944    Specimen: Blood Updated: 02/16/24 0952    Narrative:      The following orders were created for panel order CBC & Differential.  Procedure                               Abnormality         Status                     ---------                               -----------         ------                      CBC Auto Differential[[id number]]        Abnormal            Final result                 Please view results for these tests on the individual orders.    CBC Auto Differential [[id number]]  (Abnormal) Collected: 02/16/24 0944    Specimen: Blood Updated: 02/16/24 0952     WBC 13.73 10*3/mm3      RBC 4.25 10*6/mm3      Hemoglobin 14.2 g/dL      Hematocrit 42.1 %      MCV 99.1 fL      MCH 33.4 pg      MCHC 33.7 g/dL      RDW 12.5 %      RDW-SD 44.8 fl      MPV 9.0 fL      Platelets 256 10*3/mm3      Neutrophil % 75.8 %      Lymphocyte % 15.1 %      Monocyte % 7.8 %      Eosinophil % 0.7 %      Basophil % 0.1 %      Immature Grans % 0.5 %      Neutrophils, Absolute 10.40 10*3/mm3      Lymphocytes, Absolute 2.08 10*3/mm3      Monocytes, Absolute 1.07 10*3/mm3      Eosinophils, Absolute 0.09 10*3/mm3      Basophils, Absolute 0.02 10*3/mm3      Immature Grans, Absolute 0.07 10*3/mm3      nRBC 0.0 /100 WBC           Imaging Results (Last 24 Hours)       Procedure Component Value Units Date/Time    CT Head Without Contrast [[id number]] Collected: 02/16/24 1101     Updated: 02/16/24 1104    Narrative:      PROCEDURE: CT HEAD WO CONTRAST     COMPARISON:  None  INDICATIONS: seizure     PROTOCOL:   Standard imaging protocol performed      RADIATION:   DLP: 1145.2mGy*cm    MA and/or KV was adjusted to minimize radiation dose.          TECHNIQUE: After obtaining the patient's consent, CT images were obtained without non-ionic   intravenous contrast material.      FINDINGS:   The sulci, fissures, ventricles, and basal cisterns are within range of normal.  There is no acute   hemorrhage, midline shift, or suspicious extra-axial fluid collections.  The orbital contents are   normal.  The calvarium is within range of normal.  There is acute on chronic paranasal sinus   disease.  There is near complete opacification of the right frontal and right maxillary sinuses.    Several of the ethmoid air cells are completely opacified.  There  is a mucoid fluid level in the   left sphenoid sinus.  The mastoid sinuses are clear.       Impression:         1. No acute intracranial findings.  2. Acute on chronic paranasal sinus disease.            SALLY MARC MD         Electronically Signed and Approved By: SALLY MARC MD on 2/16/2024 at 11:01                           Orders (last 24 hrs)        Start     Ordered    02/17/24 0600  CBC (No Diff)  Morning Draw         02/16/24 1451    02/16/24 2100  sodium chloride 0.9 % flush 10 mL  Every 12 Hours Scheduled         02/16/24 1451    02/16/24 2100  levETIRAcetam (KEPPRA) tablet 500 mg  Every 12 Hours Scheduled        Note to Pharmacy: For tube route administration disperse crushed tablets in 10 mL of water, shake for 5 minutes to dissolve, and administer immediately via enteral feeding tube.    02/16/24 1451    02/16/24 1800  Incentive Spirometry  Every 4 Hours While Awake       02/16/24 1451    02/16/24 1600  Vital Signs  Every 4 Hours       02/16/24 1451    02/16/24 1515  Enoxaparin Sodium (LOVENOX) syringe 40 mg  Every 24 Hours         02/16/24 1451    02/16/24 1452  Daily Weights  Daily       02/16/24 1451    02/16/24 1451  MRI Brain With & Without Contrast  1 Time Imaging         02/16/24 1451    02/16/24 1449  ondansetron ODT (ZOFRAN-ODT) disintegrating tablet 4 mg  Every 6 Hours PRN         02/16/24 1451    02/16/24 1448  sennosides-docusate (PERICOLACE) 8.6-50 MG per tablet 2 tablet  2 Times Daily PRN        See Hyperspace for full Linked Orders Report.    02/16/24 1451    02/16/24 1448  polyethylene glycol (MIRALAX) packet 17 g  Daily PRN        See Hyperspace for full Linked Orders Report.    02/16/24 1451    02/16/24 1448  bisacodyl (DULCOLAX) EC tablet 5 mg  Daily PRN        See Hyperspace for full Linked Orders Report.    02/16/24 1451    02/16/24 1448  bisacodyl (DULCOLAX) suppository 10 mg  Daily PRN        See Hyperspace for full Linked Orders Report.    02/16/24 1451    02/16/24 1444   acetaminophen (TYLENOL) tablet 1,000 mg  Every 8 Hours PRN         02/16/24 1451    02/16/24 1448  Diet: Regular/House Diet; Texture: Regular Texture (IDDSI 7); Fluid Consistency: Thin (IDDSI 0)  Diet Effective Now         02/16/24 1451    02/16/24 1447  Pulse Oximetry,  Spot  Every Shift       02/16/24 1451    02/16/24 1446  Notify Provider (With Default Parameters)  Until Discontinued         02/16/24 1451    02/16/24 1446  Intake & Output  Every Shift       02/16/24 1451    02/16/24 1446  Weigh Patient  Once         02/16/24 1451    02/16/24 1446  Fall Precautions  Continuous         02/16/24 1451    02/16/24 1446  Tobacco Cessation Education  Once         02/16/24 1451    02/16/24 1446  Insert Peripheral IV  Once         02/16/24 1451    02/16/24 1446  Saline Lock & Maintain IV Access  Continuous,   Status:  Canceled         02/16/24 1451    02/16/24 1446  Initiate Observation Status  Once         02/16/24 1451    02/16/24 1446  Code Status and Medical Interventions:  Continuous         02/16/24 1451    02/16/24 1446  Continuous Cardiac Monitoring  Continuous        Comments: Follow Standing Orders As Outlined in Process Instructions (Open Order Report to View Full Instructions)    02/16/24 1451    02/16/24 1446  Telemetry - Maintain IV Access  Continuous         02/16/24 1451    02/16/24 1446  Telemetry - Place Orders & Notify Provider of Results When Patient Experiences Acute Chest Pain, Dysrhythmia or Respiratory Distress  Until Discontinued         02/16/24 1451    02/16/24 1445  nitroglycerin (NITROSTAT) SL tablet 0.4 mg  Every 5 Minutes PRN         02/16/24 1451    02/16/24 1445  melatonin tablet 5 mg  Nightly PRN         02/16/24 1451    02/16/24 1445  sodium chloride 0.9 % flush 10 mL  As Needed         02/16/24 1451    02/16/24 1445  sodium chloride 0.9 % infusion 40 mL  As Needed         02/16/24 1451    02/16/24 1357  Hospitalist (on-call MD unless specified)  Once        Specialty:  Hospitalist   Provider:  Lucrecia Navarro MD    02/16/24 1356    02/16/24 1116  Inpatient Neurology Consult Other (see comments) (seizure)  Once        Specialty:  Neurology  Provider:  (Not yet assigned)    02/16/24 1116    02/16/24 1115  levETIRAcetam in NaCl 0.75% (KEPPRA) IVPB 1,000 mg  Once         02/16/24 1052    02/16/24 1053  POC Glucose Once  PROCEDURE ONCE        Comments: Complete no more than 45 minutes prior to patient eating      02/16/24 1050    02/16/24 1030  lactated ringers bolus 500 mL  Once         02/16/24 1013    02/16/24 1014  CT Head Without Contrast  1 Time Imaging         02/16/24 1013    02/16/24 1013  Magnesium  Once         02/16/24 1013    02/16/24 1013  TSH  Once         02/16/24 1013    02/16/24 1013  CK  Once         02/16/24 1013    02/16/24 1013  Urine Drug Screen - Urine, Clean Catch  Once         02/16/24 1013    02/16/24 0937  Seizure Precautions  Continuous         02/16/24 0936    02/16/24 0937  NPO Diet NPO Type: Strict NPO  Diet Effective Now,   Status:  Canceled         02/16/24 0936    02/16/24 0937  Cardiac Monitoring  Continuous,   Status:  Canceled        Comments: Follow Standing Orders As Outlined in Process Instructions (Open Order Report to View Full Instructions)    02/16/24 0936    02/16/24 0937  Continuous Pulse Oximetry  Continuous         02/16/24 0936    02/16/24 0937  Vital Signs  Per Hospital Policy         02/16/24 0936    02/16/24 0937  Insert Peripheral IV  Once         02/16/24 0936    02/16/24 0937  [place] Draw  Once         02/16/24 0936    02/16/24 0937  POC Glucose Once  Once        Comments: Complete no more than 45 minutes prior to patient eating      02/16/24 0936    02/16/24 0937  CBC & Differential  Once         02/16/24 0936    02/16/24 0937  Comprehensive Metabolic Panel  Once         02/16/24 0936    02/16/24 0937  Green Top (Gel)  PROCEDURE ONCE         02/16/24 0936    02/16/24 0937  Lavender Top  PROCEDURE ONCE         02/16/24 0936    02/16/24  0937  Gold Top - SST  PROCEDURE ONCE         02/16/24 0936    02/16/24 0937  Light Blue Top  PROCEDURE ONCE         02/16/24 0936    02/16/24 0937  CBC Auto Differential  PROCEDURE ONCE         02/16/24 0936    02/16/24 0936  sodium chloride 0.9 % flush 10 mL  As Needed         02/16/24 0936    02/13/24 0000  azithromycin (ZITHROMAX) 250 MG tablet  Take As Directed         02/16/24 1504    02/13/24 0000  HYDROcodone-acetaminophen (NORCO) 5-325 MG per tablet  Every 6 Hours PRN         02/16/24 1504    02/13/24 0000  ibuprofen (ADVIL,MOTRIN) 800 MG tablet  Every 8 Hours PRN         02/16/24 1504    02/08/24 0000  OXcarbazepine (TRILEPTAL) 600 MG tablet  Every 12 Hours Scheduled         02/16/24 1504    Unscheduled  Oxygen Therapy- Nasal Cannula; Titrate 1-6 LPM Per SpO2; 90 - 95%  Continuous PRN       02/16/24 0936    Unscheduled  Follow Acute Urinary Retention Protocol  As Needed       02/16/24 1451    Unscheduled  Up With Assistance  As Needed       02/16/24 1451    Unscheduled  Oxygen Therapy- Nasal Cannula; Titrate 1-6 LPM Per SpO2; 90 - 95%  Continuous PRN       02/16/24 1451    --  acetaminophen (TYLENOL) 325 MG tablet  Every 6 Hours PRN         02/16/24 1505                     Consult Notes (last 24 hours)        Danny Elliott MD at 02/16/24 1314            [facility]    Stat Consult    Patient Name:   Christian Hercules  YOB: 1990  Identification Number:   MRN - [id number]  Date of Service:   02/16/2024 11:30:08    Diagnosis:        R56.9 - Seizures    Impression  33-year-old male prior substance abuse (3 years sober on suboxone), chronic pain (gabapentin) and Bipolar disorder (Trileptal 600mg BID increased this week) presents to ED with suspected new onset seizures. First witnessed in car while his significant other was driving (2 minute tonic-seizure with foaming at the mouth and apnea). He had a 2nd episode witnessed in ED of generalized tonic  clonic activity seen and posticial period. He is now conscious but a little confused. The ED gave Keppra 1000mg after second episode. Weight 66kg. Video exam was nonfocal but limited as I didn't have nurse to help. Head CT shows no acute changes per radiology. GFR 97.1.    Possibilities include epileptic and nonepileptic seizures. Given 2 seizures, we should continue Keppra 500mg BID (he is also on gabapentin for pain and  Trileptal 600mg BID for Bipolar disorder). He will need telemetry, toxic-screen, MRI brain with keaton and EEG. See below.       Recommendations:  Our recommendations are outlined below.    Diagnostic Studies :  MRI brain w/wo contrast  EEG    Medications :  Keppra 500 mg BID    Nursing Recommendations :  Maintain Euglycemia and Euthermia  Neuro checks      Seizure precautions :  Seizure precautions including no driving for state mandated time frame were discussed with patient with clear understanding    DVT Prophylaxis :  Choice of Primary Team    Disposition :  Neurology will follow      ----------------------------------------------------------------------------------------------------        Metrics:  [facility] Notification Time: 02/16/2024 11:29:39  Stamp Time: 02/16/2024 11:30:08  Callback Response Time: 02/16/2024 11:29:43    Primary Provider Notified of Diagnostic Impression and Management Plan on: 02/16/2024 13:13:57          ----------------------------------------------------------------------------------------------------    Chief Complaint:  seizures    History of Present Illness:  Patient is a 33 year old Male.  33-year-old male prior substance abuse (3 years sober on suboxone), chronic pain (gabapentin) and Bipolar disorder (Trileptal 600mg BID increased this week) presents to ED with suspected new onset seizures. First witnessed in car while his significant other was driving (2 minute tonic-seizure with foaming at the mouth and apnea). He had a 2nd episode witnessed in ED of  generalized tonic clonic activity seen and posticial period. He is now conscious but a little confused. The ED gave Keppra 1000mg after second episode.    He denies history of CVA, seizure, encephalitis, head trauma, ETOH abuse or illicit drug use.    I met with him with his fiance.         Medications:    No Anticoagulant use   No Antiplatelet use  Other Medications Pertinent To Assessment Include: Suboxone, Trileptal, Gabapentin, Ibuprofen    Allergies:   NKDA    Social History:  Alcohol Use: No  Drug Use: No    Family History:    There Is Family History Of: No FH of seizures    ROS: A complete pertinent review of system was obtained and was negative.     Examination:  BP(108/52), Pulse(62),    Neuro Exam:  He is alert and knows year (not month) and hospital. He folllows commands.He speaks fluently. EOMI. Face symmetric. He could hold all extremities without drift. FNF and HTS intact. I did not have nurse to help with exam.       Spoke with :         Patient / Family was informed the Neurology Consult would occur via TeleHealth consult by way of interactive audio and video telecommunications and consented to receiving care in this manner.    Patient is being evaluated for possible acute neurologic impairment and high probability of imminent or life - threatening deterioration.I spent total of 30-minutes providing care to this patient, including time for face to face visit via telemedicine, review of medical records, imaging studies and discussion of findings with providers, the patient and / or family.      Dr Danny Elliott      [facility]  For Inpatient follow-up with [facility] physician please call [facility] [phone]. This is not an outpatient service. Post hospital discharge, please contact hospital directly.    Please do not communicate with [facility] physicians via secure chat. If you have any questions, Please contact [facility].  Please call or reconsult our service if there are any clinical  or diagnostic changes.       Electronically signed by Danny Elliott MD at 02/16/24 7671

## 2024-02-16 NOTE — CONSULTS
[facility]  [facility] TeleNeurology Consult Services    Stat Consult    Patient Name:   Christian Hercules  YOB: 1990  Identification Number:   MRN - [id number]  Date of Service:   02/16/2024 11:30:08    Diagnosis:        R56.9 - Seizures    Impression  33-year-old male prior substance abuse (3 years sober on suboxone), chronic pain (gabapentin) and Bipolar disorder (Trileptal 600mg BID increased this week) presents to ED with suspected new onset seizures. First witnessed in car while his significant other was driving (2 minute tonic-seizure with foaming at the mouth and apnea). He had a 2nd episode witnessed in ED of generalized tonic clonic activity seen and posticial period. He is now conscious but a little confused. The ED gave Keppra 1000mg after second episode. Weight 66kg. Video exam was nonfocal but limited as I didn't have nurse to help. Head CT shows no acute changes per radiology. GFR 97.1.    Possibilities include epileptic and nonepileptic seizures. Given 2 seizures, we should continue Keppra 500mg BID (he is also on gabapentin for pain and  Trileptal 600mg BID for Bipolar disorder). He will need telemetry, toxic-screen, MRI brain with keaton and EEG. See below.       Recommendations:  Our recommendations are outlined below.    Diagnostic Studies :  MRI brain w/wo contrast  EEG    Medications :  Keppra 500 mg BID    Nursing Recommendations :  Maintain Euglycemia and Euthermia  Neuro checks      Seizure precautions :  Seizure precautions including no driving for state mandated time frame were discussed with patient with clear understanding    DVT Prophylaxis :  Choice of Primary Team    Disposition :  Neurology will follow      ----------------------------------------------------------------------------------------------------        Metrics:  [facility] Notification Time: 02/16/2024 11:29:39  Stamp Time: 02/16/2024 11:30:08  Callback Response Time: 02/16/2024 11:29:43    Primary  Provider Notified of Diagnostic Impression and Management Plan on: 02/16/2024 13:13:57          ----------------------------------------------------------------------------------------------------    Chief Complaint:  seizures    History of Present Illness:  Patient is a 33 year old Male.  33-year-old male prior substance abuse (3 years sober on suboxone), chronic pain (gabapentin) and Bipolar disorder (Trileptal 600mg BID increased this week) presents to ED with suspected new onset seizures. First witnessed in car while his significant other was driving (2 minute tonic-seizure with foaming at the mouth and apnea). He had a 2nd episode witnessed in ED of generalized tonic clonic activity seen and posticial period. He is now conscious but a little confused. The ED gave Keppra 1000mg after second episode.    He denies history of CVA, seizure, encephalitis, head trauma, ETOH abuse or illicit drug use.    I met with him with his fiance.         Medications:    No Anticoagulant use   No Antiplatelet use  Other Medications Pertinent To Assessment Include: Suboxone, Trileptal, Gabapentin, Ibuprofen    Allergies:   NKDA    Social History:  Alcohol Use: No  Drug Use: No    Family History:    There Is Family History Of: No FH of seizures    ROS: A complete pertinent review of system was obtained and was negative.     Examination:  BP(108/52), Pulse(62),    Neuro Exam:  He is alert and knows year (not month) and hospital. He folllows commands.He speaks fluently. EOMI. Face symmetric. He could hold all extremities without drift. FNF and HTS intact. I did not have nurse to help with exam.       Spoke with :         Patient / Family was informed the Neurology Consult would occur via TeleHealth consult by way of interactive audio and video telecommunications and consented to receiving care in this manner.    Patient is being evaluated for possible acute neurologic impairment and high probability of imminent or life -  threatening deterioration.I spent total of 30-minutes providing care to this patient, including time for face to face visit via telemedicine, review of medical records, imaging studies and discussion of findings with providers, the patient and / or family.      Dr Danny Elliott      [facility]  For Inpatient follow-up with [facility] physician please call [facility] [phone]. This is not an outpatient service. Post hospital discharge, please contact hospital directly.    Please do not communicate with [facility] physicians via secure chat. If you have any questions, Please contact [facility].  Please call or reconsult our service if there are any clinical or diagnostic changes.

## 2024-02-16 NOTE — PLAN OF CARE
Goal Outcome Evaluation:   Patient alert and oriented, very drowsy, on room air, MRI needs to be completed, urine needs to be collected, seizure pads in place.

## 2024-02-16 NOTE — ED PROVIDER NOTES
Time: 10:06 AM EST  Date of encounter:  2/16/2024  Independent Historian/Clinical History and Information was obtained by:   Patient and Family    History is limited by: N/A    Chief Complaint: Seizure      History of Present Illness:  Patient is a 33 y.o. year old male who presents to the emergency department for evaluation of seizure. Family reports that they were in the car getting food when the patient started to twitch/shiver, then had an approximate 2 minute tonic-seizure w foaming at the mouth and apnea. No loss of bowel or bladder control. Per family, there is no hx of seizures. 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 Family also reports that patient doubled his trileptal today from 300mg BID to 600mg BID. Denies fever, cough, SOA, CP, edema.       Patient Care Team  Primary Care Provider: Provider, No Known    Past Medical History:     No Known Allergies  Past Medical History:   Diagnosis Date    Seizures      Past Surgical History:   Procedure Laterality Date    FRACTURE SURGERY      HIP SURGERY Right      History reviewed. No pertinent family history.    Home Medications:  Prior to Admission medications    Medication Sig Start Date End Date Taking? Authorizing Provider   buprenorphine-naloxone (SUBOXONE) 8-2 MG film film PLACE 2 & 3/4 films UNDER THE TONGUE EVERY DAY 5/18/23   Juana Coronado MD   gabapentin (NEURONTIN) 600 MG tablet TAKE 1 TABLET BY MOUTH THREE TIMES DAILY. may take an additional TABLET AS NEEDED for uncontrolled pain 5/18/23   Juana Coronado MD   sertraline (ZOLOFT) 100 MG tablet Take 100 mg by mouth Daily.    Emergency, Nurse Brii, RN        Social History:   Social History     Tobacco Use    Smoking status: Every Day     Packs/day: 1     Types: Cigarettes    Smokeless tobacco: Never   Vaping Use    Vaping Use: Never used   Substance Use Topics    Alcohol use: Never     "Drug use: Not Currently         Review of Systems:  Review of Systems   Constitutional:  Negative for chills and fever.   HENT:  Negative for congestion, ear pain and sore throat.    Eyes:  Negative for pain.   Respiratory:  Negative for cough, chest tightness and shortness of breath.    Cardiovascular:  Negative for chest pain.   Gastrointestinal:  Negative for abdominal pain, diarrhea, nausea and vomiting.   Genitourinary:  Negative for flank pain and hematuria.   Musculoskeletal:  Negative for joint swelling.   Skin:  Negative for pallor.   Neurological:  Positive for seizures. Negative for headaches.   All other systems reviewed and are negative.       Physical Exam:  /67   Pulse 76   Temp 97.9 °F (36.6 °C)   Resp 16   Ht 177.8 cm (70\")   Wt 66 kg (145 lb 8.1 oz)   SpO2 97%   BMI 20.88 kg/m²     Physical Exam  Vitals and nursing note reviewed.   Constitutional:       General: He is not in acute distress.     Appearance: Normal appearance. He is not toxic-appearing.   HENT:      Head: Normocephalic and atraumatic.      Mouth/Throat:      Mouth: Mucous membranes are moist.   Eyes:      General: No scleral icterus.     Comments: PERRLA   Cardiovascular:      Rate and Rhythm: Normal rate and regular rhythm.      Pulses: Normal pulses.      Heart sounds: Normal heart sounds.   Pulmonary:      Effort: Pulmonary effort is normal. No respiratory distress.      Breath sounds: Normal breath sounds.   Abdominal:      General: Abdomen is flat.      Palpations: Abdomen is soft.      Tenderness: There is no abdominal tenderness.   Musculoskeletal:         General: Normal range of motion.      Cervical back: Normal range of motion and neck supple.   Skin:     General: Skin is warm and dry.   Neurological:      Mental Status: He is alert. He is lethargic and confused.      GCS: GCS eye subscore is 4. GCS verbal subscore is 4. GCS motor subscore is 6.      Comments: Oriented to person, place, situation. Disoriented " to year and month.                 Procedures:  Procedures      Medical Decision Making:      Comorbidities that affect care:    None    External Notes reviewed:    Previous Clinic Note: Urgent care note with CHERYL Price, date 6/8/2023 was reviewed by me.      The following orders were placed and all results were independently analyzed by me:  Orders Placed This Encounter   Procedures    CT Head Without Contrast    Aristes Draw    Comprehensive Metabolic Panel    CBC Auto Differential    Magnesium    TSH    CK    Urine Drug Screen - Urine, Clean Catch    NPO Diet NPO Type: Strict NPO    Continuous Pulse Oximetry    Vital Signs    Inpatient Neurology Consult Other (see comments) (seizure)    Hospitalist (on-call MD unless specified)    Oxygen Therapy- Nasal Cannula; Titrate 1-6 LPM Per SpO2; 90 - 95%    POC Glucose Once    POC Glucose Once    Insert Peripheral IV    Seizure Precautions    CBC & Differential    Green Top (Gel)    Lavender Top    Gold Top - SST    Light Blue Top       Medications Given in the Emergency Department:  Medications   sodium chloride 0.9 % flush 10 mL (has no administration in time range)   lactated ringers bolus 500 mL (500 mL Intravenous New Bag 2/16/24 1107)   levETIRAcetam in NaCl 0.75% (KEPPRA) IVPB 1,000 mg (0 mg Intravenous Stopped 2/16/24 1142)        ED Course:     The patient was seen and evaluated the ED by me.  The above history and physical examination was performed as document.  Patient had a witnessed seizure while in CT scan.  Patient had a postictal phase as well.  Patient is returned back to baseline.  Teleneurology consult was obtained.  In the interim patient was also loaded with Keppra.  Since the patient's had 2 seizures today the neurologist recommended patient be admitted for observation.  He provides some additional workup recommendations.  He does state that patient will need an EEG but this can be done as an outpatient unless the patient has additional  seizures while on the Keppra and Trileptal.  Subsequently, I consult the hospitalist and spoke with Dr. Navarro who agreed admit the patient.    Labs:    Lab Results (last 24 hours)       Procedure Component Value Units Date/Time    CBC & Differential [[id number]]  (Abnormal) Collected: 02/16/24 0944    Specimen: Blood Updated: 02/16/24 0952    Narrative:      The following orders were created for panel order CBC & Differential.  Procedure                               Abnormality         Status                     ---------                               -----------         ------                     CBC Auto Differential[[id number]]        Abnormal            Final result                 Please view results for these tests on the individual orders.    Comprehensive Metabolic Panel [[id number]]  (Abnormal) Collected: 02/16/24 0944    Specimen: Blood Updated: 02/16/24 1013     Glucose 181 mg/dL      BUN 18 mg/dL      Creatinine 1.03 mg/dL      Sodium 137 mmol/L      Potassium 3.5 mmol/L      Chloride 98 mmol/L      CO2 18.3 mmol/L      Calcium 10.4 mg/dL      Total Protein 7.4 g/dL      Albumin 4.6 g/dL      ALT (SGPT) 12 U/L      AST (SGOT) 17 U/L      Alkaline Phosphatase 97 U/L      Total Bilirubin 0.2 mg/dL      Globulin 2.8 gm/dL      A/G Ratio 1.6 g/dL      BUN/Creatinine Ratio 17.5     Anion Gap 20.7 mmol/L      eGFR 97.1 mL/min/1.73     Narrative:      GFR Normal >60  Chronic Kidney Disease <60  Kidney Failure <15      CBC Auto Differential [[id number]]  (Abnormal) Collected: 02/16/24 0944    Specimen: Blood Updated: 02/16/24 0952     WBC 13.73 10*3/mm3      RBC 4.25 10*6/mm3      Hemoglobin 14.2 g/dL      Hematocrit 42.1 %      MCV 99.1 fL      MCH 33.4 pg      MCHC 33.7 g/dL      RDW 12.5 %      RDW-SD 44.8 fl      MPV 9.0 fL      Platelets 256 10*3/mm3      Neutrophil % 75.8 %      Lymphocyte % 15.1 %      Monocyte % 7.8 %      Eosinophil % 0.7 %      Basophil % 0.1 %      Immature Grans % 0.5 %       Neutrophils, Absolute 10.40 10*3/mm3      Lymphocytes, Absolute 2.08 10*3/mm3      Monocytes, Absolute 1.07 10*3/mm3      Eosinophils, Absolute 0.09 10*3/mm3      Basophils, Absolute 0.02 10*3/mm3      Immature Grans, Absolute 0.07 10*3/mm3      nRBC 0.0 /100 WBC     Magnesium [[id number]]  (Normal) Collected: 02/16/24 0944    Specimen: Blood Updated: 02/16/24 1029     Magnesium 2.6 mg/dL     TSH [[id number]]  (Normal) Collected: 02/16/24 0944    Specimen: Blood Updated: 02/16/24 1036     TSH 2.370 uIU/mL     CK [[id number]]  (Normal) Collected: 02/16/24 0944    Specimen: Blood Updated: 02/16/24 1029     Creatine Kinase 85 U/L     POC Glucose Once [[id number]]  (Abnormal) Collected: 02/16/24 1050    Specimen: Blood Updated: 02/16/24 1052     Glucose 160 mg/dL      Comment: Serial Number: [id number]  [id number]                Imaging:    CT Head Without Contrast    Result Date: 2/16/2024  PROCEDURE: CT HEAD WO CONTRAST  COMPARISON:  None INDICATIONS: seizure  PROTOCOL:   Standard imaging protocol performed    RADIATION:   DLP: 1145.2mGy*cm   MA and/or KV was adjusted to minimize radiation dose.     TECHNIQUE: After obtaining the patient's consent, CT images were obtained without non-ionic intravenous contrast material.  FINDINGS:  The sulci, fissures, ventricles, and basal cisterns are within range of normal.  There is no acute hemorrhage, midline shift, or suspicious extra-axial fluid collections.  The orbital contents are normal.  The calvarium is within range of normal.  There is acute on chronic paranasal sinus disease.  There is near complete opacification of the right frontal and right maxillary sinuses.  Several of the ethmoid air cells are completely opacified.  There is a mucoid fluid level in the left sphenoid sinus.  The mastoid sinuses are clear.        1. No acute intracranial findings. 2. Acute on chronic paranasal sinus disease.     SALLY MARC MD       Electronically Signed and Approved By: SALLY  MONICO DEL TORO on 2/16/2024 at 11:01                Differential Diagnosis and Discussion:    Seizure: Differential diagnosis includes but is not limited to meningitis, hypoglycemia, electrolyte abnormalities, intracranial hemorrhage, toxin induced, and pseudoseizure.    All labs were reviewed and interpreted by me.  CT scan radiology impression was interpreted by me.    MDM     Amount and/or Complexity of Data Reviewed  Clinical lab tests: reviewed  Tests in the radiology section of CPT®: reviewed         Critical Care Note: Total Critical Care time of 40 minutes. Total critical care time documented does not include time spent on separately billed procedures for services of nurses or physician assistants. I personally saw and examined the patient. I have reviewed all diagnostic interpretations and treatment plans as written. I was present for the key portions of any procedures performed and the inclusive time noted in any critical care statement. Critical care time includes patient management by me, time spent at the patients bedside,  time to review lab and imaging results, discussing patient care, documentation in the medical record, and time spent with family or caregiver.        Patient Care Considerations:    MRI: I considered ordering an MRI however this can be performed an inpatient workup as deemed necessary.      Consultants/Shared Management Plan:    Hospitalist: I have discussed the case with Dr. Mueller who agrees to accept the patient for admission.  Consultant: I have discussed the case with teleneurologist who agrees to consult on the patient.    Social Determinants of Health:    Patient has presented with family members who are responsible, reliable and will ensure follow up care.      Disposition and Care Coordination:    Admit:   Through independent evaluation of the patient's history, physical, and imperical data, the patient meets criteria for inpatient admission to the hospital.        Final  diagnoses:   Seizure        ED Disposition       ED Disposition   Decision to Admit    Condition   --    Comment   --               This medical record created using voice recognition software.            Kade Liu DO  02/18/24 3578

## 2024-02-17 ENCOUNTER — READMISSION MANAGEMENT (OUTPATIENT)
Dept: CALL CENTER | Facility: HOSPITAL | Age: 34
End: 2024-02-17
Payer: COMMERCIAL

## 2024-02-17 VITALS
WEIGHT: 143.74 LBS | HEIGHT: 70 IN | HEART RATE: 76 BPM | OXYGEN SATURATION: 98 % | SYSTOLIC BLOOD PRESSURE: 95 MMHG | RESPIRATION RATE: 18 BRPM | DIASTOLIC BLOOD PRESSURE: 60 MMHG | TEMPERATURE: 98.2 F | BODY MASS INDEX: 20.58 KG/M2

## 2024-02-17 LAB
DEPRECATED RDW RBC AUTO: 44.6 FL (ref 37–54)
ERYTHROCYTE [DISTWIDTH] IN BLOOD BY AUTOMATED COUNT: 12.3 % (ref 12.3–15.4)
HCT VFR BLD AUTO: 37.5 % (ref 37.5–51)
HGB BLD-MCNC: 12.6 G/DL (ref 13–17.7)
HOLD SPECIMEN: NORMAL
MCH RBC QN AUTO: 32.8 PG (ref 26.6–33)
MCHC RBC AUTO-ENTMCNC: 33.6 G/DL (ref 31.5–35.7)
MCV RBC AUTO: 97.7 FL (ref 79–97)
PLATELET # BLD AUTO: 239 10*3/MM3 (ref 140–450)
PMV BLD AUTO: 9.2 FL (ref 6–12)
RBC # BLD AUTO: 3.84 10*6/MM3 (ref 4.14–5.8)
WBC NRBC COR # BLD AUTO: 5.84 10*3/MM3 (ref 3.4–10.8)

## 2024-02-17 PROCEDURE — G0378 HOSPITAL OBSERVATION PER HR: HCPCS

## 2024-02-17 PROCEDURE — 99212 OFFICE O/P EST SF 10 MIN: CPT | Performed by: PSYCHIATRY & NEUROLOGY

## 2024-02-17 PROCEDURE — 85027 COMPLETE CBC AUTOMATED: CPT | Performed by: INTERNAL MEDICINE

## 2024-02-17 PROCEDURE — 36415 COLL VENOUS BLD VENIPUNCTURE: CPT | Performed by: INTERNAL MEDICINE

## 2024-02-17 PROCEDURE — 99238 HOSP IP/OBS DSCHRG MGMT 30/<: CPT | Performed by: INTERNAL MEDICINE

## 2024-02-17 RX ORDER — LEVETIRACETAM 500 MG/1
500 TABLET ORAL EVERY 12 HOURS SCHEDULED
Qty: 60 TABLET | Refills: 1 | Status: SHIPPED | OUTPATIENT
Start: 2024-02-17

## 2024-02-17 RX ORDER — DIAZEPAM 10 MG/2G
5 GEL RECTAL ONCE
Qty: 1 EACH | Refills: 0 | Status: SHIPPED | OUTPATIENT
Start: 2024-02-17 | End: 2024-02-17

## 2024-02-17 RX ADMIN — Medication 10 ML: at 08:35

## 2024-02-17 RX ADMIN — GABAPENTIN 800 MG: 400 CAPSULE ORAL at 15:04

## 2024-02-17 RX ADMIN — BUPRENORPHINE AND NALOXONE 1 TABLET: 8; 2 TABLET SUBLINGUAL at 08:34

## 2024-02-17 RX ADMIN — OXCARBAZEPINE 600 MG: 300 TABLET, FILM COATED ORAL at 08:34

## 2024-02-17 RX ADMIN — GABAPENTIN 800 MG: 400 CAPSULE ORAL at 08:34

## 2024-02-17 RX ADMIN — LEVETIRACETAM 500 MG: 500 TABLET, FILM COATED ORAL at 08:34

## 2024-02-17 NOTE — PROGRESS NOTES
"[facility] Tele[facility]urology Progress Note    Date of Service 2/17/2024      Presentation:    Based on previous neurology note(s)   : \" 33-year-old male prior substance abuse (3 years sober on suboxone), chronic pain (gabapentin) and Bipolar disorder (Trileptal 600mg BID increased this week) presents to ED with suspected new onset seizures. First witnessed in car while his significant other was driving (2 minute tonic-seizure with foaming at the mouth and apnea). He had a 2nd episode witnessed in ED of generalized tonic clonic activity seen and posticial period. He is now conscious but a little confused. The ED gave Keppra 1000mg after second episode.       Interval history:        2/17/2024: no seizures overnight     Impression:    bipolar disorder    new onset seizure    Recommendations:    1- Brain MRI unremarkable   2- Routine EEG, I ordered but this can be done as outpatient   3- Seizure precautions per state law (inpatient and outpatient)  4- Antiepileptic drug: maintain on home dose gabapentin and Trileptal and new medication Keppra 500mg BID  5- Give Lorazepam 2 mg IV PRN for seizures > 3 min or > 3 episodes in one hour. Don't give more than 6-8 mg total in 24 hour period).    If possible Please avoid aminophylline, theophylline, isoniazid, lindane, metronidazole, nalidixic acid, meropenem/imipenem, cefepime, TCAs, bupropion, cyclosporine, chlorambucil, tramadol, clozapine, or other drugs which can lower seizure threshold.      Physical Therapy/Occupational Therapy/Speech Therapy/ Rehab (if/when) eligible  Continue with Telemetry  DVT prophylaxis (choice of primary team)    [facility] Neurologist will follow up with results but OK to discharge if seizure free for 24 hours and if EEG is going to be done as outpatient. outpatient neurology follow up in 3-4 weeks.  Please contact [facility] Navigator to reach me if further questions/concerns arise.    Examination:    lethargic (woke from " sleep)  speech no aphasia  Extraocular movements intact  face symmetric  arms no drift (10s)      Tele[facility] Neurology consultation was provided using synchronous audio/video teleconferencing with patient's verbal informed consent (if patient able to provide). I spent 13 minutes providing telehealth care. This includes time spent for face to face visit via telemedicine, review of medical records, 14 points review of systems, pertinent past medical, surgical, social and family history, imaging studies and discussion of findings with providers, the patient and/or family.      Dr Vahid Behravan      [facility]  [phone]

## 2024-02-17 NOTE — DISCHARGE SUMMARY
[facility]         HOSPITALIST  DISCHARGE SUMMARY    Patient Name: Christian Hercules  : 1990  MRN: [id number]    Date of Admission: 2024  Date of Discharge: 2024  Primary Care Physician: Provider, No Known    Consults       Date and Time Order Name Status Description    2024  1:56 PM Hospitalist (on-call MD unless specified)      2024 11:16 AM Inpatient Neurology Consult Other (see comments) (seizure)              Active and Resolved Hospital Problems:  Active Hospital Problems    Diagnosis POA    **Seizure [R56.9] Yes      Resolved Hospital Problems   No resolved problems to display.       Hospital Course     Hospital Course:  Christian Hercules is a 33 y.o. male with past medical history significant for bipolar disorder, previous substance abuse who has been sober for 3 years on Suboxone and chronic pain on gabapentin who presents emergency department after he was witnessed by his girlfriend to have had a seizure.  She is currently not available to describe that event but per the documentation it was reported as a tonic-clonic seizure with foaming at the mouth and apnea.  EMS was called the patient was brought to the emergency department.  On arrival to the ED he was noted to be postictal.  While in the ED the patient underwent a CT of the brain and while in CT he had a witnessed generalized tonic-clonic seizure with subsequent postictal state.  He received 1 g of IV Keppra.  Teleneurology was consulted from the emergency department and recommendations were made to initiate the patient on Keppra 500 mg twice daily and admission to the hospitalist service for further valuation.     The patient does not recall the events which led to his admission.  He denies any previous history of seizures.  He denies any headache no recent visual changes no fevers no chills.    Patient was admitted to the hospitalist service.  He had no further seizure activity post admission.  Neurology recommended  continuing Keppra 500 mg p.o. twice daily.  Orders have been placed for outpatient EEG and the patient should follow-up with neurology as outpatient as well.        DISCHARGE Follow Up Recommendations for labs and diagnostics: EEG      Day of Discharge     Vital Signs:  Temp:  [97.9 °F (36.6 °C)-98.2 °F (36.8 °C)] 98.2 °F (36.8 °C)  Heart Rate:  [71-84] 76  Resp:  [18] 18  BP: ()/(37-71) 95/60  Physical Exam:               Constitutional: Awake, alert, no acute distress              Eyes: Pupils equally round and reactive, sclerae anicteric, no conjunctival injection              HENT: NCAT, mucous membranes moist              Neck: Supple, no thyromegaly, no lymphadenopathy, trachea midline              Respiratory: Clear to auscultation bilaterally, nonlabored respirations               Cardiovascular: RRR, no appreciable murmurs, palpable pedal pulses bilaterally              Gastrointestinal: Positive bowel sounds, soft, nontender, nondistended              Musculoskeletal: No bilateral ankle edema, no clubbing or cyanosis to extremities              Psychiatric: Appropriate affect, cooperative              Neurologic: Oriented x 3, no extremities equally-no focal weakness, Cranial Nerves grossly intact to confrontation, speech clear                 Discharge Details        Discharge Medications        New Medications        Instructions Start Date   Diazepam 5 MG rectal suppository  Commonly known as: Valium   5 mg, Rectal, As Needed      levETIRAcetam 500 MG tablet  Commonly known as: KEPPRA   500 mg, Oral, Every 12 Hours Scheduled             Continue These Medications        Instructions Start Date   acetaminophen 325 MG tablet  Commonly known as: TYLENOL   650 mg, Oral, Every 6 Hours PRN      azithromycin 250 MG tablet  Commonly known as: ZITHROMAX   250 mg, Oral, Take As Directed, TAKE 2 TABLETS BY MOUTH ON DAY 1, THEN TAKE 1 TABLET DAILY ON DAYS 2 TO 5      buprenorphine-naloxone 8-2 MG film  film  Commonly known as: SUBOXONE   Place 2.5 films under the tongue Daily.      gabapentin 800 MG tablet  Commonly known as: NEURONTIN   Take 1 tablet by mouth 3 (Three) Times a Day.      ibuprofen 800 MG tablet  Commonly known as: ADVIL,MOTRIN   800 mg, Oral, Every 8 Hours PRN      OXcarbazepine 600 MG tablet  Commonly known as: TRILEPTAL   1 tablet, Oral, Every 12 Hours Scheduled             Stop These Medications      HYDROcodone-acetaminophen 5-325 MG per tablet  Commonly known as: NORCO              Allergies   Allergen Reactions    Tetanus Toxoids Anaphylaxis and Angioedema       Discharge Disposition:  Home or Self Care    Diet:  Hospital:  Diet Order   Procedures    Diet: Regular/House Diet; Texture: Regular Texture (IDDSI 7); Fluid Consistency: Thin (IDDSI 0)       Discharge Activity:   Activity Instructions       Driving Restrictions      Type of Restriction:  Driving  Bathing       Driving Restrictions: No Driving    Bathing Restrictions: No Tub Bath    Other Activity Instructions      Activity Instructions: As tolerated.  Avoid swimming or tub bathing, heights, operating dangerous machinery/equipment  Do not drive until released to do so by your neurologist            CODE STATUS:  Code Status and Medical Interventions:   Ordered at: 02/16/24 1451     Code Status (Patient has no pulse and is not breathing):    CPR (Attempt to Resuscitate)     Medical Interventions (Patient has pulse or is breathing):    Full Support         No future appointments.    Additional Instructions for the Follow-ups that You Need to Schedule       Call MD With Problems / Concerns   As directed      Instructions: Recurrent seizure    Order Comments: Instructions: Recurrent seizure         Discharge Follow-up with PCP   As directed       Currently Documented PCP:    Provider, No Known    PCP Phone Number:    None     Follow Up Details: hospital follow up 1 week                Pertinent  and/or Most Recent Results     PROCEDURES:    None    LAB RESULTS:      Lab 02/17/24  0454 02/16/24  0944   WBC 5.84 13.73*   HEMOGLOBIN 12.6* 14.2   HEMATOCRIT 37.5 42.1   PLATELETS 239 256   NEUTROS ABS  --  10.40*   IMMATURE GRANS (ABS)  --  0.07*   LYMPHS ABS  --  2.08   MONOS ABS  --  1.07*   EOS ABS  --  0.09   MCV 97.7* 99.1*         Lab 02/16/24  0944   SODIUM 137   POTASSIUM 3.5   CHLORIDE 98   CO2 18.3*   ANION GAP 20.7*   BUN 18   CREATININE 1.03   EGFR 97.1   GLUCOSE 181*   CALCIUM 10.4   MAGNESIUM 2.6   TSH 2.370         Lab 02/16/24  0944   TOTAL PROTEIN 7.4   ALBUMIN 4.6   GLOBULIN 2.8   ALT (SGPT) 12   AST (SGOT) 17   BILIRUBIN 0.2   ALK PHOS 97                     Brief Urine Lab Results       None          Microbiology Results (last 10 days)       ** No results found for the last 240 hours. **            CT Head Without Contrast    Result Date: 2/16/2024    1. No acute intracranial findings. 2. Acute on chronic paranasal sinus disease.     SALLY MARC MD       Electronically Signed and Approved By: SALLY MARC MD on 2/16/2024 at 11:01                 MRI of the brain with/without contrast    FINDINGS:  The ventricles have a normal size and configuration.  No evidence of acute intracranial hemorrhage,   mass or midline shift.  No extra-axial fluid collections are identified.  There are no areas of   abnormal contrast enhancement.  There is near complete opacification of the right frontal, ethmoid   and maxillary sinuses.  No evidence of acute ischemia or demyelinating disease on diffusion and   FLAIR weighted images.  The hippocampi have a symmetric appearance.     IMPRESSION:      1. Normal MRI of the brain.     2. Near complete opacification of the right frontal, ethmoid and maxillary sinuses.        Labs Pending at Discharge: None        Time spent on Discharge including face to face service: 30 minutes    Electronically signed by Lucrecia Navarro MD, 02/17/24, 1:06 PM EST.

## 2024-02-17 NOTE — DISCHARGE INSTRUCTIONS
Avoid swimming or tub bathing, heights, operating dangerous machinery/equipment  Do not drive until released to do so by your neurologist

## 2024-03-25 ENCOUNTER — TRANSCRIBE ORDERS (OUTPATIENT)
Dept: ADMINISTRATIVE | Facility: HOSPITAL | Age: 34
End: 2024-03-25
Payer: COMMERCIAL

## 2024-03-25 DIAGNOSIS — G40.309 BENIGN NEONATAL CONVULSIONS: Primary | ICD-10-CM

## 2024-03-29 ENCOUNTER — TRANSCRIBE ORDERS (OUTPATIENT)
Dept: ADMINISTRATIVE | Facility: HOSPITAL | Age: 34
End: 2024-03-29
Payer: COMMERCIAL

## 2024-03-29 DIAGNOSIS — M54.2 CERVICALGIA: Primary | ICD-10-CM

## 2024-03-29 DIAGNOSIS — M54.12 CERVICAL RADICULOPATHY: ICD-10-CM

## 2024-04-18 ENCOUNTER — HOSPITAL ENCOUNTER (OUTPATIENT)
Dept: NEUROLOGY | Facility: HOSPITAL | Age: 34
Discharge: HOME | End: 2024-04-18
Payer: COMMERCIAL

## 2024-04-18 DIAGNOSIS — R56.9 SEIZURE: ICD-10-CM

## 2024-04-18 PROCEDURE — 95816 EEG AWAKE AND DROWSY: CPT

## 2024-05-08 ENCOUNTER — HOSPITAL ENCOUNTER (EMERGENCY)
Facility: HOSPITAL | Age: 34
Discharge: HOME | End: 2024-05-08
Attending: EMERGENCY MEDICINE
Payer: COMMERCIAL

## 2024-05-08 VITALS
DIASTOLIC BLOOD PRESSURE: 73 MMHG | SYSTOLIC BLOOD PRESSURE: 123 MMHG | HEIGHT: 70 IN | OXYGEN SATURATION: 96 % | TEMPERATURE: 99.4 F | BODY MASS INDEX: 20.58 KG/M2 | WEIGHT: 143.74 LBS | RESPIRATION RATE: 16 BRPM | HEART RATE: 84 BPM

## 2024-05-08 DIAGNOSIS — R56.9 SEIZURE: Primary | ICD-10-CM

## 2024-05-08 LAB
ALBUMIN SERPL-MCNC: 4.5 G/DL (ref 3.5–5.2)
ALBUMIN/GLOB SERPL: 1.8 G/DL
ALP SERPL-CCNC: 136 U/L (ref 39–117)
ALT SERPL W P-5'-P-CCNC: 11 U/L (ref 1–41)
ANION GAP SERPL CALCULATED.3IONS-SCNC: 10.6 MMOL/L (ref 5–15)
AST SERPL-CCNC: 16 U/L (ref 1–40)
BASOPHILS # BLD AUTO: 0.01 10*3/MM3 (ref 0–0.2)
BASOPHILS NFR BLD AUTO: 0.2 % (ref 0–1.5)
BILIRUB SERPL-MCNC: 0.2 MG/DL (ref 0–1.2)
BUN SERPL-MCNC: 8 MG/DL (ref 6–20)
BUN/CREAT SERPL: 9.3 (ref 7–25)
CALCIUM SPEC-SCNC: 9.4 MG/DL (ref 8.6–10.5)
CHLORIDE SERPL-SCNC: 98 MMOL/L (ref 98–107)
CO2 SERPL-SCNC: 26.4 MMOL/L (ref 22–29)
CREAT SERPL-MCNC: 0.86 MG/DL (ref 0.76–1.27)
DEPRECATED RDW RBC AUTO: 41.1 FL (ref 37–54)
EGFRCR SERPLBLD CKD-EPI 2021: 115.8 ML/MIN/1.73
EOSINOPHIL # BLD AUTO: 0.2 10*3/MM3 (ref 0–0.4)
EOSINOPHIL NFR BLD AUTO: 3.1 % (ref 0.3–6.2)
ERYTHROCYTE [DISTWIDTH] IN BLOOD BY AUTOMATED COUNT: 11.5 % (ref 12.3–15.4)
GLOBULIN UR ELPH-MCNC: 2.5 GM/DL
GLUCOSE SERPL-MCNC: 117 MG/DL (ref 65–99)
HCT VFR BLD AUTO: 42.1 % (ref 37.5–51)
HGB BLD-MCNC: 14.3 G/DL (ref 13–17.7)
HOLD SPECIMEN: NORMAL
IMM GRANULOCYTES # BLD AUTO: 0.02 10*3/MM3 (ref 0–0.05)
IMM GRANULOCYTES NFR BLD AUTO: 0.3 % (ref 0–0.5)
LYMPHOCYTES # BLD AUTO: 1.48 10*3/MM3 (ref 0.7–3.1)
LYMPHOCYTES NFR BLD AUTO: 23 % (ref 19.6–45.3)
MCH RBC QN AUTO: 33.3 PG (ref 26.6–33)
MCHC RBC AUTO-ENTMCNC: 34 G/DL (ref 31.5–35.7)
MCV RBC AUTO: 97.9 FL (ref 79–97)
MONOCYTES # BLD AUTO: 0.48 10*3/MM3 (ref 0.1–0.9)
MONOCYTES NFR BLD AUTO: 7.5 % (ref 5–12)
NEUTROPHILS NFR BLD AUTO: 4.24 10*3/MM3 (ref 1.7–7)
NEUTROPHILS NFR BLD AUTO: 65.9 % (ref 42.7–76)
NRBC BLD AUTO-RTO: 0 /100 WBC (ref 0–0.2)
PLATELET # BLD AUTO: 295 10*3/MM3 (ref 140–450)
PMV BLD AUTO: 9 FL (ref 6–12)
POTASSIUM SERPL-SCNC: 3.6 MMOL/L (ref 3.5–5.2)
PROT SERPL-MCNC: 7 G/DL (ref 6–8.5)
QT INTERVAL: 346 MS
QTC INTERVAL: 441 MS
RBC # BLD AUTO: 4.3 10*6/MM3 (ref 4.14–5.8)
SODIUM SERPL-SCNC: 135 MMOL/L (ref 136–145)
WBC NRBC COR # BLD AUTO: 6.43 10*3/MM3 (ref 3.4–10.8)
WHOLE BLOOD HOLD COAG: NORMAL

## 2024-05-08 PROCEDURE — 93005 ELECTROCARDIOGRAM TRACING: CPT | Performed by: EMERGENCY MEDICINE

## 2024-05-08 PROCEDURE — 99283 EMERGENCY DEPT VISIT LOW MDM: CPT

## 2024-05-08 PROCEDURE — 80053 COMPREHEN METABOLIC PANEL: CPT

## 2024-05-08 PROCEDURE — 93005 ELECTROCARDIOGRAM TRACING: CPT

## 2024-05-08 PROCEDURE — 85025 COMPLETE CBC W/AUTO DIFF WBC: CPT

## 2024-05-08 RX ORDER — ONDANSETRON 2 MG/ML
4 INJECTION INTRAMUSCULAR; INTRAVENOUS ONCE
Status: DISCONTINUED | OUTPATIENT
Start: 2024-05-08 | End: 2024-05-08 | Stop reason: HOSPADM

## 2024-05-08 NOTE — ED PROVIDER NOTES
Time: 6:30 PM EDT  Date of encounter:  5/8/2024  Independent Historian/Clinical History and Information was obtained by:   Patient    History is limited by: N/A    Chief Complaint: Seizure      History of Present Illness:  Patient is a 33 y.o. year old male who presents to the emergency department for evaluation of seizure.  The patient reports that he accidentally took double his medications at the same time.  Patient denies chest pain or shortness of breath.  Patient has no cough hemoptysis.  Patient denies nausea, vomiting, and diarrhea.  Patient has no leg pain or swelling.  Patient has no subjective neurological doves including numbness or tingling.    HPI    Patient Care Team  Primary Care Provider: Provider, No Known    Past Medical History:     Allergies   Allergen Reactions    Tetanus Toxoids Anaphylaxis and Angioedema     Past Medical History:   Diagnosis Date    Seizures      Past Surgical History:   Procedure Laterality Date    FRACTURE SURGERY      HIP SURGERY Right      History reviewed. No pertinent family history.    Home Medications:  Prior to Admission medications    Medication Sig Start Date End Date Taking? Authorizing Provider   acetaminophen (TYLENOL) 325 MG tablet Take 2 tablets by mouth Every 6 (Six) Hours As Needed for Mild Pain.    Juana Coronado MD   azithromycin (ZITHROMAX) 250 MG tablet Take 1 tablet by mouth Take As Directed. TAKE 2 TABLETS BY MOUTH ON DAY 1, THEN TAKE 1 TABLET DAILY ON DAYS 2 TO 5 2/13/24   Juana Coronado MD   buprenorphine-naloxone (SUBOXONE) 8-2 MG film film Place 2.5 films under the tongue Daily. 5/18/23   Juana Coronado MD   gabapentin (NEURONTIN) 800 MG tablet Take 1 tablet by mouth 3 (Three) Times a Day. 5/18/23   Juana Coronado MD   ibuprofen (ADVIL,MOTRIN) 800 MG tablet Take 1 tablet by mouth Every 8 (Eight) Hours As Needed for Mild Pain. 2/13/24   Juana Coronado MD   levETIRAcetam (KEPPRA) 500 MG tablet Take 1 tablet by mouth  "Every 12 (Twelve) Hours. 2/17/24   Lucrecia Navarro MD   OXcarbazepine (TRILEPTAL) 600 MG tablet Take 1 tablet by mouth Every 12 (Twelve) Hours. 2/8/24   Provider, MD Juana        Social History:   Social History     Tobacco Use    Smoking status: Every Day     Current packs/day: 1.00     Average packs/day: 1 pack/day for 10.0 years (10.0 ttl pk-yrs)     Types: Cigarettes    Smokeless tobacco: Never   Vaping Use    Vaping status: Never Used   Substance Use Topics    Alcohol use: Never    Drug use: Not Currently         Review of Systems:  Review of Systems   Constitutional:  Negative for chills and fever.   HENT:  Negative for congestion, rhinorrhea and sore throat.    Eyes:  Negative for pain and visual disturbance.   Respiratory:  Negative for apnea, cough, chest tightness and shortness of breath.    Cardiovascular:  Negative for chest pain and palpitations.   Gastrointestinal:  Negative for abdominal pain, diarrhea, nausea and vomiting.   Genitourinary:  Negative for difficulty urinating and dysuria.   Musculoskeletal:  Negative for joint swelling and myalgias.   Skin:  Negative for color change.   Neurological:  Positive for seizures. Negative for headaches.   Psychiatric/Behavioral: Negative.     All other systems reviewed and are negative.       Physical Exam:  /80 (BP Location: Right arm, Patient Position: Sitting)   Pulse 92   Temp 99.3 °F (37.4 °C) (Oral)   Resp 19   Ht 177.8 cm (70\")   Wt 65.2 kg (143 lb 11.8 oz)   SpO2 98%   BMI 20.62 kg/m²     Physical Exam  Vitals and nursing note reviewed.   Constitutional:       General: He is not in acute distress.     Appearance: Normal appearance. He is not toxic-appearing.   HENT:      Head: Normocephalic and atraumatic.      Jaw: There is normal jaw occlusion.   Eyes:      General: Lids are normal.      Extraocular Movements: Extraocular movements intact.      Conjunctiva/sclera: Conjunctivae normal.      Pupils: Pupils are equal, round, " and reactive to light.   Cardiovascular:      Rate and Rhythm: Normal rate and regular rhythm.      Pulses: Normal pulses.      Heart sounds: Normal heart sounds.   Pulmonary:      Effort: Pulmonary effort is normal. No respiratory distress.      Breath sounds: Normal breath sounds. No wheezing or rhonchi.   Abdominal:      General: Abdomen is flat.      Palpations: Abdomen is soft.      Tenderness: There is no abdominal tenderness. There is no guarding or rebound.   Musculoskeletal:         General: Normal range of motion.      Cervical back: Normal range of motion and neck supple.      Right lower leg: No edema.      Left lower leg: No edema.   Skin:     General: Skin is warm and dry.   Neurological:      Mental Status: He is alert and oriented to person, place, and time. Mental status is at baseline.   Psychiatric:         Mood and Affect: Mood normal.                  Procedures:  Procedures      Medical Decision Making:      Comorbidities that affect care:    Seizures    External Notes reviewed:    Hospital Discharge Summary: Patient was last admitted to the hospital for seizure.      The following orders were placed and all results were independently analyzed by me:  Orders Placed This Encounter   Procedures    Comprehensive Metabolic Panel    CBC Auto Differential    ECG 12 Lead Altered Mental Status    CBC & Differential    Extra Tubes    Gold Top - SST    Light Blue Top       Medications Given in the Emergency Department:  Medications   ondansetron (ZOFRAN) injection 4 mg (has no administration in time range)        ED Course:         Labs:    Lab Results (last 24 hours)       Procedure Component Value Units Date/Time    CBC & Differential [[id number]]  (Abnormal) Collected: 05/08/24 1641    Specimen: Blood Updated: 05/08/24 1711    Narrative:      The following orders were created for panel order CBC & Differential.  Procedure                               Abnormality         Status                      ---------                               -----------         ------                     CBC Auto Differential[[id number]]        Abnormal            Final result                 Please view results for these tests on the individual orders.    Comprehensive Metabolic Panel [[id number]]  (Abnormal) Collected: 05/08/24 1641    Specimen: Blood Updated: 05/08/24 1735     Glucose 117 mg/dL      BUN 8 mg/dL      Creatinine 0.86 mg/dL      Sodium 135 mmol/L      Potassium 3.6 mmol/L      Chloride 98 mmol/L      CO2 26.4 mmol/L      Calcium 9.4 mg/dL      Total Protein 7.0 g/dL      Albumin 4.5 g/dL      ALT (SGPT) 11 U/L      AST (SGOT) 16 U/L      Alkaline Phosphatase 136 U/L      Total Bilirubin 0.2 mg/dL      Globulin 2.5 gm/dL      A/G Ratio 1.8 g/dL      BUN/Creatinine Ratio 9.3     Anion Gap 10.6 mmol/L      eGFR 115.8 mL/min/1.73     Narrative:      GFR Normal >60  Chronic Kidney Disease <60  Kidney Failure <15      CBC Auto Differential [[id number]]  (Abnormal) Collected: 05/08/24 1641    Specimen: Blood Updated: 05/08/24 1711     WBC 6.43 10*3/mm3      RBC 4.30 10*6/mm3      Hemoglobin 14.3 g/dL      Hematocrit 42.1 %      MCV 97.9 fL      MCH 33.3 pg      MCHC 34.0 g/dL      RDW 11.5 %      RDW-SD 41.1 fl      MPV 9.0 fL      Platelets 295 10*3/mm3      Neutrophil % 65.9 %      Lymphocyte % 23.0 %      Monocyte % 7.5 %      Eosinophil % 3.1 %      Basophil % 0.2 %      Immature Grans % 0.3 %      Neutrophils, Absolute 4.24 10*3/mm3      Lymphocytes, Absolute 1.48 10*3/mm3      Monocytes, Absolute 0.48 10*3/mm3      Eosinophils, Absolute 0.20 10*3/mm3      Basophils, Absolute 0.01 10*3/mm3      Immature Grans, Absolute 0.02 10*3/mm3      nRBC 0.0 /100 WBC              Imaging:    No Radiology Exams Resulted Within Past 24 Hours      Differential Diagnosis and Discussion:    Seizure: Differential diagnosis includes but is not limited to meningitis, hypoglycemia, electrolyte abnormalities, intracranial hemorrhage,  toxin induced, and pseudoseizure.    All labs were reviewed and interpreted by me.    MDM     The patient is resting comfortably and feels better, is alert, talkative and in no distress.  The patient´s CBC that was reviewed and interpreted by me shows no abnormalities of critical concern. Of note, there is no anemia requiring a blood transfusion and the platelet count is acceptable.  The patient´s CMP that was reviewed and interpretted by me shows no abnormalities of critical concern. Of note, the patient´s sodium and potassium are acceptable. The patient´s liver enzymes are unremarkable. The patient´s renal function (creatinine) is preserved. The patient has a normal anion gap.  Patient states that he does not want a CT scan of his head currently.  The repeat examination is unremarkable and benign. The patient is neurologically intact, has a normal mental status and this ambulatory in the ED. The history, exam, diagnostic testing in the patient's current condition do not suggest meningitis, stroke, sepsis, subarachnoid hemorrhage, intracranial bleeding, encephalitis or other significant pathology that would warrant further testing, continued ED treatment, admission, neurological consultation, or other specialist evaluation at this point. The vital signs have been stable. The patient's condition is stable and appropriate for discharge. The patient will pursue further outpatient evaluation with the primary care physician or other designated or consulting position as indicated in the discharge instructions.          Patient Care Considerations:    ANTIBIOTICS: I considered prescribing antibiotics as an outpatient however no bacterial focus of infection was found.      Consultants/Shared Management Plan:    None    Social Determinants of Health:    Patient is independent, reliable, and has access to care.       Disposition and Care Coordination:    Discharged: I considered escalation of care by admitting this patient  to the hospital, however patient has had no return of seizure-like activity and is requesting discharge.    I have explained the patient´s condition, diagnoses and treatment plan based on the information available to me at this time. I have answered questions and addressed any concerns. The patient has a good  understanding of the patient´s diagnosis, condition, and treatment plan as can be expected at this point. The vital signs have been stable. The patient´s condition is stable and appropriate for discharge from the emergency department.      The patient will pursue further outpatient evaluation with the primary care physician or other designated or consulting physician as outlined in the discharge instructions. They are agreeable to this plan of care and follow-up instructions have been explained in detail. The patient has received these instructions in written format and has expressed an understanding of the discharge instructions. The patient is aware that any significant change in condition or worsening of symptoms should prompt an immediate return to this or the closest emergency department or call to 911.  I have explained discharge medications and the need for follow up with the patient/caretakers. This was also printed in the discharge instructions. Patient was discharged with the following medications and follow up:      Medication List      No changes were made to your prescriptions during this visit.      Provider, No Known  [facility]  [place] [address]             Final diagnoses:   Seizure        ED Disposition       ED Disposition   Discharge    Condition   Stable    Comment   --               This medical record created using voice recognition software.             Yesenia Ford MD  05/08/24 7651

## 2024-05-12 ENCOUNTER — HOSPITAL ENCOUNTER (EMERGENCY)
Facility: HOSPITAL | Age: 34
Discharge: LEFT BEFORE DISCHARGE | End: 2024-05-13
Payer: COMMERCIAL

## 2024-05-12 VITALS
SYSTOLIC BLOOD PRESSURE: 109 MMHG | RESPIRATION RATE: 18 BRPM | TEMPERATURE: 98.3 F | WEIGHT: 134.92 LBS | OXYGEN SATURATION: 97 % | BODY MASS INDEX: 19.32 KG/M2 | HEART RATE: 61 BPM | HEIGHT: 70 IN | DIASTOLIC BLOOD PRESSURE: 70 MMHG

## 2024-05-12 PROCEDURE — 99211 OFF/OP EST MAY X REQ PHY/QHP: CPT

## 2024-05-13 NOTE — ED TRIAGE NOTES
"Pt arrives to ED with complaints of penial pain.. pt states \"I was having sex and it slipped out and hit between my partners vaginal and anus opening\"  pt is worries he may have \"sprained or broken his penis\"   "

## 2024-05-13 NOTE — ED NOTES
"Pt girlfriend stated \"I gave him one of his mothers oxycodone 30mg for pain\" pt appears sleepy   "

## 2024-05-27 LAB
QT INTERVAL: 346 MS
QTC INTERVAL: 441 MS

## 2024-08-17 ENCOUNTER — APPOINTMENT (OUTPATIENT)
Dept: GENERAL RADIOLOGY | Facility: HOSPITAL | Age: 34
End: 2024-08-17
Payer: MEDICAID

## 2024-08-17 ENCOUNTER — APPOINTMENT (OUTPATIENT)
Dept: CT IMAGING | Facility: HOSPITAL | Age: 34
End: 2024-08-17
Payer: MEDICAID

## 2024-08-17 ENCOUNTER — HOSPITAL ENCOUNTER (EMERGENCY)
Facility: HOSPITAL | Age: 34
Discharge: HOME | End: 2024-08-17
Attending: EMERGENCY MEDICINE
Payer: MEDICAID

## 2024-08-17 VITALS
SYSTOLIC BLOOD PRESSURE: 142 MMHG | BODY MASS INDEX: 17.93 KG/M2 | WEIGHT: 125.22 LBS | HEIGHT: 70 IN | HEART RATE: 82 BPM | DIASTOLIC BLOOD PRESSURE: 91 MMHG | TEMPERATURE: 99 F | RESPIRATION RATE: 20 BRPM | OXYGEN SATURATION: 96 %

## 2024-08-17 DIAGNOSIS — R56.9 SEIZURES: Primary | ICD-10-CM

## 2024-08-17 LAB
ALBUMIN SERPL-MCNC: 4.1 G/DL (ref 3.5–5.2)
ALBUMIN/GLOB SERPL: 1.9 G/DL
ALP SERPL-CCNC: 117 U/L (ref 39–117)
ALT SERPL W P-5'-P-CCNC: 12 U/L (ref 1–41)
ANION GAP SERPL CALCULATED.3IONS-SCNC: 15 MMOL/L (ref 5–15)
AST SERPL-CCNC: 16 U/L (ref 1–40)
BASOPHILS # BLD AUTO: 0.02 10*3/MM3 (ref 0–0.2)
BASOPHILS NFR BLD AUTO: 0.3 % (ref 0–1.5)
BILIRUB SERPL-MCNC: 0.6 MG/DL (ref 0–1.2)
BILIRUB UR QL STRIP: NEGATIVE
BUN SERPL-MCNC: 8 MG/DL (ref 6–20)
BUN/CREAT SERPL: 8.4 (ref 7–25)
CALCIUM SPEC-SCNC: 9.3 MG/DL (ref 8.6–10.5)
CHLORIDE SERPL-SCNC: 100 MMOL/L (ref 98–107)
CLARITY UR: CLEAR
CO2 SERPL-SCNC: 24 MMOL/L (ref 22–29)
COLOR UR: YELLOW
CREAT SERPL-MCNC: 0.95 MG/DL (ref 0.76–1.27)
DEPRECATED RDW RBC AUTO: 44.9 FL (ref 37–54)
EGFRCR SERPLBLD CKD-EPI 2021: 106.4 ML/MIN/1.73
EOSINOPHIL # BLD AUTO: 0.09 10*3/MM3 (ref 0–0.4)
EOSINOPHIL NFR BLD AUTO: 1.2 % (ref 0.3–6.2)
ERYTHROCYTE [DISTWIDTH] IN BLOOD BY AUTOMATED COUNT: 12.6 % (ref 12.3–15.4)
GLOBULIN UR ELPH-MCNC: 2.2 GM/DL
GLUCOSE SERPL-MCNC: 190 MG/DL (ref 65–99)
GLUCOSE UR STRIP-MCNC: NEGATIVE MG/DL
HCT VFR BLD AUTO: 42.7 % (ref 37.5–51)
HGB BLD-MCNC: 13.8 G/DL (ref 13–17.7)
HGB UR QL STRIP.AUTO: NEGATIVE
HOLD SPECIMEN: NORMAL
HOLD SPECIMEN: NORMAL
IMM GRANULOCYTES # BLD AUTO: 0.02 10*3/MM3 (ref 0–0.05)
IMM GRANULOCYTES NFR BLD AUTO: 0.3 % (ref 0–0.5)
KETONES UR QL STRIP: NEGATIVE
LEUKOCYTE ESTERASE UR QL STRIP.AUTO: NEGATIVE
LYMPHOCYTES # BLD AUTO: 1.19 10*3/MM3 (ref 0.7–3.1)
LYMPHOCYTES NFR BLD AUTO: 15.2 % (ref 19.6–45.3)
MAGNESIUM SERPL-MCNC: 1.9 MG/DL (ref 1.6–2.6)
MCH RBC QN AUTO: 30.9 PG (ref 26.6–33)
MCHC RBC AUTO-ENTMCNC: 32.3 G/DL (ref 31.5–35.7)
MCV RBC AUTO: 95.7 FL (ref 79–97)
MONOCYTES # BLD AUTO: 0.52 10*3/MM3 (ref 0.1–0.9)
MONOCYTES NFR BLD AUTO: 6.7 % (ref 5–12)
NEUTROPHILS NFR BLD AUTO: 5.97 10*3/MM3 (ref 1.7–7)
NEUTROPHILS NFR BLD AUTO: 76.3 % (ref 42.7–76)
NITRITE UR QL STRIP: NEGATIVE
NRBC BLD AUTO-RTO: 0 /100 WBC (ref 0–0.2)
PH UR STRIP.AUTO: 7 [PH] (ref 5–8)
PLATELET # BLD AUTO: 189 10*3/MM3 (ref 140–450)
PMV BLD AUTO: 9.6 FL (ref 6–12)
POTASSIUM SERPL-SCNC: 3.9 MMOL/L (ref 3.5–5.2)
PROT SERPL-MCNC: 6.3 G/DL (ref 6–8.5)
PROT UR QL STRIP: NEGATIVE
RBC # BLD AUTO: 4.46 10*6/MM3 (ref 4.14–5.8)
SODIUM SERPL-SCNC: 139 MMOL/L (ref 136–145)
SP GR UR STRIP: 1.01 (ref 1–1.03)
TROPONIN T SERPL HS-MCNC: 10 NG/L
UROBILINOGEN UR QL STRIP: NORMAL
WBC NRBC COR # BLD AUTO: 7.81 10*3/MM3 (ref 3.4–10.8)
WHOLE BLOOD HOLD COAG: NORMAL
WHOLE BLOOD HOLD SPECIMEN: NORMAL

## 2024-08-17 PROCEDURE — 83735 ASSAY OF MAGNESIUM: CPT | Performed by: EMERGENCY MEDICINE

## 2024-08-17 PROCEDURE — 81003 URINALYSIS AUTO W/O SCOPE: CPT | Performed by: EMERGENCY MEDICINE

## 2024-08-17 PROCEDURE — 84484 ASSAY OF TROPONIN QUANT: CPT | Performed by: EMERGENCY MEDICINE

## 2024-08-17 PROCEDURE — 93005 ELECTROCARDIOGRAM TRACING: CPT | Performed by: EMERGENCY MEDICINE

## 2024-08-17 PROCEDURE — 71045 X-RAY EXAM CHEST 1 VIEW: CPT

## 2024-08-17 PROCEDURE — 70450 CT HEAD/BRAIN W/O DYE: CPT

## 2024-08-17 PROCEDURE — 80053 COMPREHEN METABOLIC PANEL: CPT | Performed by: EMERGENCY MEDICINE

## 2024-08-17 PROCEDURE — 85025 COMPLETE CBC W/AUTO DIFF WBC: CPT | Performed by: EMERGENCY MEDICINE

## 2024-08-17 PROCEDURE — 25010000002 LEVETRIRACETAM PER 10 MG: Performed by: EMERGENCY MEDICINE

## 2024-08-17 PROCEDURE — 96374 THER/PROPH/DIAG INJ IV PUSH: CPT

## 2024-08-17 PROCEDURE — 99284 EMERGENCY DEPT VISIT MOD MDM: CPT

## 2024-08-17 PROCEDURE — 93005 ELECTROCARDIOGRAM TRACING: CPT

## 2024-08-17 RX ORDER — LEVETIRACETAM 500 MG/1
500 TABLET ORAL EVERY 12 HOURS SCHEDULED
Qty: 60 TABLET | Refills: 3 | Status: SHIPPED | OUTPATIENT
Start: 2024-08-17

## 2024-08-17 RX ORDER — SODIUM CHLORIDE 0.9 % (FLUSH) 0.9 %
10 SYRINGE (ML) INJECTION AS NEEDED
Status: DISCONTINUED | OUTPATIENT
Start: 2024-08-17 | End: 2024-08-17 | Stop reason: HOSPADM

## 2024-08-17 RX ORDER — LEVETIRACETAM 500 MG/5ML
1000 INJECTION, SOLUTION, CONCENTRATE INTRAVENOUS ONCE
Status: COMPLETED | OUTPATIENT
Start: 2024-08-17 | End: 2024-08-17

## 2024-08-17 RX ADMIN — LEVETIRACETAM 1000 MG: 100 INJECTION, SOLUTION INTRAVENOUS at 07:47

## 2024-08-17 NOTE — ED PROVIDER NOTES
Time: 7:05 AM EDT  Date of encounter:  8/17/2024  Independent Historian/Clinical History and Information was obtained by:   Patient and Family    History is limited by: Acuity of Condition    Chief Complaint: Seizure      History of Present Illness:  Patient is a 34 y.o. year old male who presents to the emergency department for evaluation of seizure.  Patient reports that he has a history of seizures with the first 1 happening in May.  He states he was admitted at that time and had MRIs and EEGs done.  Was post be taking Keppra.  He is currently not taking any Keppra as he only took it for a few weeks after he started it.  States that he has had 1 other seizure back in May and then family reports that he had 2 seizures today.  Both lasted roughly a minute and then he has been slightly altered afterwards.  He does return to his baseline.  No other complaints this time.      Patient Care Team  Primary Care Provider: Provider, No Known    Past Medical History:     Allergies   Allergen Reactions    Tetanus Toxoids Anaphylaxis and Angioedema     Past Medical History:   Diagnosis Date    Seizures      Past Surgical History:   Procedure Laterality Date    FRACTURE SURGERY      HIP SURGERY Right      History reviewed. No pertinent family history.    Home Medications:  Prior to Admission medications    Medication Sig Start Date End Date Taking? Authorizing Provider   acetaminophen (TYLENOL) 325 MG tablet Take 2 tablets by mouth Every 6 (Six) Hours As Needed for Mild Pain.    Juana Coronado MD   azithromycin (ZITHROMAX) 250 MG tablet Take 1 tablet by mouth Take As Directed. TAKE 2 TABLETS BY MOUTH ON DAY 1, THEN TAKE 1 TABLET DAILY ON DAYS 2 TO 5 2/13/24   Juana Coronado MD   buprenorphine-naloxone (SUBOXONE) 8-2 MG film film Place 2.5 films under the tongue Daily. 5/18/23   Juana Coronado MD   gabapentin (NEURONTIN) 800 MG tablet Take 1 tablet by mouth 3 (Three) Times a Day. 5/18/23   Cliff  "MD Juana   ibuprofen (ADVIL,MOTRIN) 800 MG tablet Take 1 tablet by mouth Every 8 (Eight) Hours As Needed for Mild Pain. 2/13/24   Juana Coronado MD   levETIRAcetam (KEPPRA) 500 MG tablet Take 1 tablet by mouth Every 12 (Twelve) Hours. 2/17/24   Lucrecia Navarro MD   OXcarbazepine (TRILEPTAL) 600 MG tablet Take 1 tablet by mouth Every 12 (Twelve) Hours. 2/8/24   ProviderJuana MD        Social History:   Social History     Tobacco Use    Smoking status: Every Day     Current packs/day: 1.00     Average packs/day: 1 pack/day for 10.0 years (10.0 ttl pk-yrs)     Types: Cigarettes    Smokeless tobacco: Never   Vaping Use    Vaping status: Never Used   Substance Use Topics    Alcohol use: Never    Drug use: Not Currently         Review of Systems:  Review of Systems   Neurological:  Positive for seizures.        Physical Exam:  /92 (BP Location: Right arm, Patient Position: Lying)   Pulse 81   Temp 98.5 °F (36.9 °C) (Oral)   Resp 17   Ht 177.8 cm (70\")   Wt 56.8 kg (125 lb 3.5 oz)   SpO2 95%   BMI 17.97 kg/m²     Physical Exam  Vitals and nursing note reviewed.   Constitutional:       Appearance: Normal appearance.   HENT:      Head: Normocephalic and atraumatic.   Eyes:      General: No scleral icterus.  Cardiovascular:      Rate and Rhythm: Normal rate and regular rhythm.      Heart sounds: Normal heart sounds.   Pulmonary:      Effort: Pulmonary effort is normal.      Breath sounds: Normal breath sounds.   Abdominal:      Palpations: Abdomen is soft.      Tenderness: There is no abdominal tenderness.   Musculoskeletal:         General: Normal range of motion.      Cervical back: Normal range of motion.   Skin:     Findings: No rash.   Neurological:      General: No focal deficit present.      Mental Status: He is alert and oriented to person, place, and time.      Cranial Nerves: No cranial nerve deficit.      Motor: Weakness present.            "       Procedures:  Procedures      Medical Decision Making:      Comorbidities that affect care:    Substance abuse, seizures    External Notes reviewed:    Reviewed note from 5/8/2024 and 2/16/2024      The following orders were placed and all results were independently analyzed by me:  Orders Placed This Encounter   Procedures    XR Chest 1 View    CT Head Without Contrast    [place] Draw    Comprehensive Metabolic Panel    Single High Sensitivity Troponin T    Magnesium    Urinalysis With Microscopic If Indicated (No Culture) - Urine, Clean Catch    CBC Auto Differential    NPO Diet NPO Type: Strict NPO    Undress & Gown    Continuous Pulse Oximetry    Vital Signs    Orthostatic Blood Pressure    Oxygen Therapy- Nasal Cannula; Titrate 1-6 LPM Per SpO2; 90 - 95%    POC Glucose Once    ECG 12 Lead ED Triage Standing Order; Weak / Dizzy / AMS    Insert Peripheral IV    Fall Precautions    CBC & Differential    Green Top (Gel)    Lavender Top    Gold Top - SST    Light Blue Top       Medications Given in the Emergency Department:  Medications   sodium chloride 0.9 % flush 10 mL (has no administration in time range)   levETIRAcetam (KEPPRA) injection 1,000 mg (1,000 mg Intravenous Given 8/17/24 0747)        ED Course:    ED Course as of 08/17/24 0812   Sat Aug 17, 2024   0801 EKG interpreted by me  Time: 0 647  Heart 74  Sinus, borderline LVH, nonspecific ST changes [MA]      ED Course User Index  [MA] Aly Shen MD       Labs:    Lab Results (last 24 hours)       Procedure Component Value Units Date/Time    CBC & Differential [[id number]]  (Abnormal) Collected: 08/17/24 0659    Specimen: Blood Updated: 08/17/24 0712    Narrative:      The following orders were created for panel order CBC & Differential.  Procedure                               Abnormality         Status                     ---------                               -----------         ------                     CBC Auto Differential[[id number]]         Abnormal            Final result                 Please view results for these tests on the individual orders.    Comprehensive Metabolic Panel [[id number]]  (Abnormal) Collected: 08/17/24 0659    Specimen: Blood Updated: 08/17/24 0727     Glucose 190 mg/dL      BUN 8 mg/dL      Creatinine 0.95 mg/dL      Sodium 139 mmol/L      Potassium 3.9 mmol/L      Chloride 100 mmol/L      CO2 24.0 mmol/L      Calcium 9.3 mg/dL      Total Protein 6.3 g/dL      Albumin 4.1 g/dL      ALT (SGPT) 12 U/L      AST (SGOT) 16 U/L      Alkaline Phosphatase 117 U/L      Total Bilirubin 0.6 mg/dL      Globulin 2.2 gm/dL      A/G Ratio 1.9 g/dL      BUN/Creatinine Ratio 8.4     Anion Gap 15.0 mmol/L      eGFR 106.4 mL/min/1.73     Narrative:      GFR Normal >60  Chronic Kidney Disease <60  Kidney Failure <15      Single High Sensitivity Troponin T [[id number]]  (Normal) Collected: 08/17/24 0659    Specimen: Blood Updated: 08/17/24 0727     HS Troponin T 10 ng/L     Narrative:      High Sensitive Troponin T Reference Range:  <14.0 ng/L- Negative Female for AMI  <22.0 ng/L- Negative Male for AMI  >=14 - Abnormal Female indicating possible myocardial injury.  >=22 - Abnormal Male indicating possible myocardial injury.   Clinicians would have to utilize clinical acumen, EKG, Troponin, and serial changes to determine if it is an Acute Myocardial Infarction or myocardial injury due to an underlying chronic condition.         Magnesium [[id number]]  (Normal) Collected: 08/17/24 0659    Specimen: Blood Updated: 08/17/24 0727     Magnesium 1.9 mg/dL     CBC Auto Differential [[id number]]  (Abnormal) Collected: 08/17/24 0659    Specimen: Blood Updated: 08/17/24 0712     WBC 7.81 10*3/mm3      RBC 4.46 10*6/mm3      Hemoglobin 13.8 g/dL      Hematocrit 42.7 %      MCV 95.7 fL      MCH 30.9 pg      MCHC 32.3 g/dL      RDW 12.6 %      RDW-SD 44.9 fl      MPV 9.6 fL      Platelets 189 10*3/mm3      Neutrophil % 76.3 %      Lymphocyte % 15.2 %       Monocyte % 6.7 %      Eosinophil % 1.2 %      Basophil % 0.3 %      Immature Grans % 0.3 %      Neutrophils, Absolute 5.97 10*3/mm3      Lymphocytes, Absolute 1.19 10*3/mm3      Monocytes, Absolute 0.52 10*3/mm3      Eosinophils, Absolute 0.09 10*3/mm3      Basophils, Absolute 0.02 10*3/mm3      Immature Grans, Absolute 0.02 10*3/mm3      nRBC 0.0 /100 WBC     Urinalysis With Microscopic If Indicated (No Culture) - Urine, Clean Catch [[id number]]  (Normal) Collected: 08/17/24 0733    Specimen: Urine, Clean Catch Updated: 08/17/24 0745     Color, UA Yellow     Appearance, UA Clear     pH, UA 7.0     Specific Gravity, UA 1.012     Glucose, UA Negative     Ketones, UA Negative     Bilirubin, UA Negative     Blood, UA Negative     Protein, UA Negative     Leuk Esterase, UA Negative     Nitrite, UA Negative     Urobilinogen, UA 0.2 E.U./dL    Narrative:      Urine microscopic not indicated.             Imaging:    CT Head Without Contrast    Result Date: 8/17/2024  CT HEAD WO CONTRAST Date of Exam: 8/17/2024 7:15 AM EDT Indication: seizure headache. Comparison: None available. Technique: Axial CT images were obtained of the head without contrast administration.  Reconstructed coronal and sagittal images were also obtained. Automated exposure control and iterative construction methods were used. FINDINGS: Brain/Ventricles: There is no acute intracranial hemorrhage, mass effect or midline shift. No abnormal extra-axial fluid collection.  The gray-white differentiation is maintained without evidence of an acute infarct.  There is no evidence of hydrocephalus Orbits: The visualized portion of the orbits demonstrate no acute abnormality. Sinuses: Opacification of the right maxillary sinus again noted with an air-fluid level. There is hyperostosis of the wall of the wall of the sinus. Findings appear to be chronic in appearance. Minimal dependent changes also noted within the sphenoid sinus. Mastoid air cells appear to be  grossly clear Soft Tissues/Skull: No acute abnormality of the visualized skull or soft tissues.     1. No acute intracranial abnormality 2. Opacification of the right maxillary sinus favored to be chronic. A degree of acute on chronic change cannot be excluded. Please correlate clinically Electronically Signed: Zachary Diamond MD  8/17/2024 7:42 AM EDT  Workstation ID: [id number]    XR Chest 1 View    Result Date: 8/17/2024  XR CHEST 1 VW Date of Exam: 8/17/2024 7:02 AM EDT Indication: Weak/Dizzy/AMS triage protocol Comparison: None available. Findings: The heart and mediastinal contours are within normal limits. Lungs are clear. Osseous structures are unremarkable.     Impression: No acute process Electronically Signed: Zachary Diamond MD  8/17/2024 7:07 AM EDT  Workstation ID: [id number]       Differential Diagnosis and Discussion:    Seizure: Differential diagnosis includes but is not limited to meningitis, hypoglycemia, electrolyte abnormalities, intracranial hemorrhage, toxin induced, and pseudoseizure.    All labs were reviewed and interpreted by me.  All X-rays impressions were independently interpreted by me.  CT scan radiology impression was interpreted by me.    MDM     Amount and/or Complexity of Data Reviewed  Clinical lab tests: reviewed  Tests in the radiology section of CPT®: reviewed  Tests in the medicine section of CPT®: reviewed       Patient is a 34-year-old gentleman who presents with complaints of seizures.  Per family has had 2 seizures this evening.  Has had a workup for seizures in this past and is supposed be on medication but is not currently taking them.  Is currently back to his baseline.  Workup here does not show any acute findings.  Did give a dose of Keppra here.  Will start back on Keppra 500 mg twice daily.  Will give outpatient follow-up with neurology.  He is stable at this time and okay for outpatient follow-up.  Discussed seizure precautions including not  driving.          Patient Care Considerations:          Consultants/Shared Management Plan:    None    Social Determinants of Health:    Patient is independent, reliable, and has access to care.       Disposition and Care Coordination:    Discharged: The patient is suitable and stable for discharge with no need for consideration of admission.    I have explained the patient´s condition, diagnoses and treatment plan based on the information available to me at this time. I have answered questions and addressed any concerns. The patient has a good  understanding of the patient´s diagnosis, condition, and treatment plan as can be expected at this point. The vital signs have been stable. The patient´s condition is stable and appropriate for discharge from the emergency department.      The patient will pursue further outpatient evaluation with the primary care physician or other designated or consulting physician as outlined in the discharge instructions. They are agreeable to this plan of care and follow-up instructions have been explained in detail. The patient has received these instructions in written format and have expressed an understanding of the discharge instructions. The patient is aware that any significant change in condition or worsening of symptoms should prompt an immediate return to this or the closest emergency department or call to 911.      Final diagnoses:   Seizures        ED Disposition       ED Disposition   Discharge    Condition   Stable    Comment   --               This medical record created using voice recognition software.             Aly Shen MD  08/17/24 0831

## 2024-08-17 NOTE — ED TRIAGE NOTES
"Pt to ED from home with wife with reports of having two seizures tonight.      First seizure occurred at midnight and second at 0600.      Pt has hx of seizure x1 in February, none since.  Is not medicated for seizures.      Pt arrives to ED diaphoretic, ashen in color, and anxious.      Wife states pt has generalized body \"tightening and he sounds like he's choking and when he finally stopps seizing he struggles to breathe\".  Seizures lasted estimated one minute.  "

## 2024-08-18 LAB
QT INTERVAL: 376 MS
QTC INTERVAL: 417 MS

## 2024-09-01 ENCOUNTER — HOSPITAL ENCOUNTER (EMERGENCY)
Facility: HOSPITAL | Age: 34
Discharge: HOME | End: 2024-09-01
Attending: EMERGENCY MEDICINE | Admitting: EMERGENCY MEDICINE
Payer: MEDICAID

## 2024-09-01 VITALS
SYSTOLIC BLOOD PRESSURE: 133 MMHG | BODY MASS INDEX: 17.01 KG/M2 | HEIGHT: 70 IN | TEMPERATURE: 98 F | OXYGEN SATURATION: 96 % | RESPIRATION RATE: 18 BRPM | WEIGHT: 118.83 LBS | DIASTOLIC BLOOD PRESSURE: 92 MMHG | HEART RATE: 85 BPM

## 2024-09-01 DIAGNOSIS — R56.9 SEIZURE: Primary | ICD-10-CM

## 2024-09-01 DIAGNOSIS — F19.130: ICD-10-CM

## 2024-09-01 LAB
ALBUMIN SERPL-MCNC: 4.6 G/DL (ref 3.5–5.2)
ALBUMIN/GLOB SERPL: 1.8 G/DL
ALP SERPL-CCNC: 128 U/L (ref 39–117)
ALT SERPL W P-5'-P-CCNC: 21 U/L (ref 1–41)
AMPHET+METHAMPHET UR QL: NEGATIVE
ANION GAP SERPL CALCULATED.3IONS-SCNC: 16.7 MMOL/L (ref 5–15)
AST SERPL-CCNC: 22 U/L (ref 1–40)
BARBITURATES UR QL SCN: NEGATIVE
BASOPHILS # BLD AUTO: 0.02 10*3/MM3 (ref 0–0.2)
BASOPHILS NFR BLD AUTO: 0.4 % (ref 0–1.5)
BENZODIAZ UR QL SCN: POSITIVE
BILIRUB SERPL-MCNC: 0.6 MG/DL (ref 0–1.2)
BUN SERPL-MCNC: 10 MG/DL (ref 6–20)
BUN/CREAT SERPL: 10 (ref 7–25)
CALCIUM SPEC-SCNC: 10 MG/DL (ref 8.6–10.5)
CANNABINOIDS SERPL QL: POSITIVE
CHLORIDE SERPL-SCNC: 97 MMOL/L (ref 98–107)
CO2 SERPL-SCNC: 24.3 MMOL/L (ref 22–29)
COCAINE UR QL: NEGATIVE
CREAT SERPL-MCNC: 1 MG/DL (ref 0.76–1.27)
DEPRECATED RDW RBC AUTO: 42.7 FL (ref 37–54)
EGFRCR SERPLBLD CKD-EPI 2021: 100 ML/MIN/1.73
EOSINOPHIL # BLD AUTO: 0.02 10*3/MM3 (ref 0–0.4)
EOSINOPHIL NFR BLD AUTO: 0.4 % (ref 0.3–6.2)
ERYTHROCYTE [DISTWIDTH] IN BLOOD BY AUTOMATED COUNT: 12.3 % (ref 12.3–15.4)
FENTANYL UR-MCNC: POSITIVE NG/ML
GLOBULIN UR ELPH-MCNC: 2.5 GM/DL
GLUCOSE BLDC GLUCOMTR-MCNC: 100 MG/DL (ref 70–99)
GLUCOSE SERPL-MCNC: 176 MG/DL (ref 65–99)
HCT VFR BLD AUTO: 43.3 % (ref 37.5–51)
HGB BLD-MCNC: 14.9 G/DL (ref 13–17.7)
HOLD SPECIMEN: NORMAL
HOLD SPECIMEN: NORMAL
IMM GRANULOCYTES # BLD AUTO: 0.02 10*3/MM3 (ref 0–0.05)
IMM GRANULOCYTES NFR BLD AUTO: 0.4 % (ref 0–0.5)
LYMPHOCYTES # BLD AUTO: 0.97 10*3/MM3 (ref 0.7–3.1)
LYMPHOCYTES NFR BLD AUTO: 21.1 % (ref 19.6–45.3)
MCH RBC QN AUTO: 32.1 PG (ref 26.6–33)
MCHC RBC AUTO-ENTMCNC: 34.4 G/DL (ref 31.5–35.7)
MCV RBC AUTO: 93.3 FL (ref 79–97)
METHADONE UR QL SCN: NEGATIVE
MONOCYTES # BLD AUTO: 0.48 10*3/MM3 (ref 0.1–0.9)
MONOCYTES NFR BLD AUTO: 10.4 % (ref 5–12)
NEUTROPHILS NFR BLD AUTO: 3.09 10*3/MM3 (ref 1.7–7)
NEUTROPHILS NFR BLD AUTO: 67.3 % (ref 42.7–76)
NRBC BLD AUTO-RTO: 0 /100 WBC (ref 0–0.2)
OPIATES UR QL: NEGATIVE
OXYCODONE UR QL SCN: NEGATIVE
PLATELET # BLD AUTO: 221 10*3/MM3 (ref 140–450)
PMV BLD AUTO: 10 FL (ref 6–12)
POTASSIUM SERPL-SCNC: 3.8 MMOL/L (ref 3.5–5.2)
PROT SERPL-MCNC: 7.1 G/DL (ref 6–8.5)
RBC # BLD AUTO: 4.64 10*6/MM3 (ref 4.14–5.8)
SODIUM SERPL-SCNC: 138 MMOL/L (ref 136–145)
WBC NRBC COR # BLD AUTO: 4.6 10*3/MM3 (ref 3.4–10.8)
WHOLE BLOOD HOLD COAG: NORMAL
WHOLE BLOOD HOLD SPECIMEN: NORMAL

## 2024-09-01 PROCEDURE — 80307 DRUG TEST PRSMV CHEM ANLYZR: CPT | Performed by: EMERGENCY MEDICINE

## 2024-09-01 PROCEDURE — 36415 COLL VENOUS BLD VENIPUNCTURE: CPT

## 2024-09-01 PROCEDURE — 99284 EMERGENCY DEPT VISIT MOD MDM: CPT

## 2024-09-01 PROCEDURE — 85025 COMPLETE CBC W/AUTO DIFF WBC: CPT

## 2024-09-01 PROCEDURE — 80053 COMPREHEN METABOLIC PANEL: CPT

## 2024-09-01 PROCEDURE — 82948 REAGENT STRIP/BLOOD GLUCOSE: CPT | Performed by: EMERGENCY MEDICINE

## 2024-09-01 RX ORDER — ONDANSETRON 4 MG/1
4 TABLET, ORALLY DISINTEGRATING ORAL EVERY 6 HOURS PRN
Qty: 12 TABLET | Refills: 0 | Status: SHIPPED | OUTPATIENT
Start: 2024-09-01

## 2024-09-01 RX ORDER — LEVETIRACETAM 500 MG/1
500 TABLET ORAL 2 TIMES DAILY
Qty: 90 TABLET | Refills: 0 | Status: SHIPPED | OUTPATIENT
Start: 2024-09-01

## 2024-09-01 RX ORDER — LEVETIRACETAM 500 MG/5ML
1000 INJECTION, SOLUTION, CONCENTRATE INTRAVENOUS ONCE
Status: DISCONTINUED | OUTPATIENT
Start: 2024-09-01 | End: 2024-09-01 | Stop reason: HOSPADM

## 2024-09-01 RX ORDER — DIAZEPAM 2.5 MG/.5ML
2.5 GEL RECTAL ONCE AS NEEDED
COMMUNITY

## 2024-09-01 RX ORDER — SODIUM CHLORIDE 0.9 % (FLUSH) 0.9 %
10 SYRINGE (ML) INJECTION AS NEEDED
Status: DISCONTINUED | OUTPATIENT
Start: 2024-09-01 | End: 2024-09-01 | Stop reason: HOSPADM

## 2024-09-01 NOTE — SIGNIFICANT NOTE
09/01/24 1323   Plan   Final Note SHAVONNE met with pt and pts mother at bedside this date to discuss inpatient detox/substance abuse treatment options. Pt reports that he would like to detox at the hospital this date; SW explained referral processes for detox at this time. Pt states he would prefer to follow up with [facility] on his own at this time due to being there before. SW notified pt that if he changed his mind regarding inpatient while he was here in the ED we would begin referral process. SHAVONNE updated provider.

## 2024-09-01 NOTE — ED PROVIDER NOTES
Time: 11:56 AM EDT  Date of encounter:  9/1/2024  Independent Historian/Clinical History and Information was obtained by:   Patient    History is limited by: N/A    Chief Complaint: Seizure      History of Present Illness:  Patient is a 34 y.o. year old male who presents to the emergency department for evaluation of seizure.  Patient reports he used carfentanil today and then had a seizure.      Patient Care Team  Primary Care Provider: Provider, No Known    Past Medical History:     Allergies   Allergen Reactions    Tetanus Toxoids Anaphylaxis and Angioedema     Past Medical History:   Diagnosis Date    Seizures      Past Surgical History:   Procedure Laterality Date    FRACTURE SURGERY      HIP SURGERY Right      History reviewed. No pertinent family history.    Home Medications:  Prior to Admission medications    Medication Sig Start Date End Date Taking? Authorizing Provider   acetaminophen (TYLENOL) 325 MG tablet Take 2 tablets by mouth Every 6 (Six) Hours As Needed for Mild Pain.    Juana Coronado MD   azithromycin (ZITHROMAX) 250 MG tablet Take 1 tablet by mouth Take As Directed. TAKE 2 TABLETS BY MOUTH ON DAY 1, THEN TAKE 1 TABLET DAILY ON DAYS 2 TO 5 2/13/24   Juana Coronado MD   buprenorphine-naloxone (SUBOXONE) 8-2 MG film film Place 2.5 films under the tongue Daily. 5/18/23   Juana Coronado MD   diazePAM (DIASTAT) 2.5 MG gel Insert 2.5 mg into the rectum 1 (One) Time As Needed.    Juana Coronado MD   gabapentin (NEURONTIN) 800 MG tablet Take 1 tablet by mouth 3 (Three) Times a Day. 5/18/23   Juana Coronado MD   ibuprofen (ADVIL,MOTRIN) 800 MG tablet Take 1 tablet by mouth Every 8 (Eight) Hours As Needed for Mild Pain. 2/13/24   Juana Coronado MD   levETIRAcetam (KEPPRA) 500 MG tablet Take 1 tablet by mouth Every 12 (Twelve) Hours. 8/17/24   Aly Shen MD   OXcarbazepine (TRILEPTAL) 600 MG tablet Take 1 tablet by mouth Every 12 (Twelve) Hours. 2/8/24    "Provider, Historical, MD        Social History:   Social History     Tobacco Use    Smoking status: Every Day     Current packs/day: 1.00     Average packs/day: 1 pack/day for 10.0 years (10.0 ttl pk-yrs)     Types: Cigarettes    Smokeless tobacco: Never   Vaping Use    Vaping status: Never Used   Substance Use Topics    Alcohol use: Never    Drug use: Not Currently         Review of Systems:  Review of Systems   Neurological:  Positive for seizures.        Physical Exam:  /74   Pulse 78   Temp 98.2 °F (36.8 °C)   Resp 18   Ht 177.8 cm (70\")   Wt 53.9 kg (118 lb 13.3 oz)   SpO2 98%   BMI 17.05 kg/m²     Physical Exam  Vitals and nursing note reviewed.   Constitutional:       General: He is not in acute distress.  Cardiovascular:      Rate and Rhythm: Normal rate and regular rhythm.      Heart sounds: Normal heart sounds.   Pulmonary:      Effort: Pulmonary effort is normal. No respiratory distress.      Breath sounds: Normal breath sounds.   Abdominal:      General: Abdomen is flat.      Palpations: Abdomen is soft.      Tenderness: There is no abdominal tenderness.   Musculoskeletal:         General: Normal range of motion.      Cervical back: Normal range of motion.   Skin:     General: Skin is warm and dry.   Neurological:      General: No focal deficit present.      Mental Status: He is alert and oriented to person, place, and time.               Procedures:  Procedures      Medical Decision Making:      Comorbidities that affect care:    History of seizures, substance abuse    External Notes reviewed:    Previous Clinic Note: Patient seen 8/17/2024 for seizure, patient had a head CT at that time which showed no acute intracranial abnormality.      The following orders were placed and all results were independently analyzed by me:  Orders Placed This Encounter   Procedures    [place] Draw    Comprehensive Metabolic Panel    Urine Drug Screen - Urine, Clean Catch    CBC Auto Differential    " Ambulatory Referral to Neurology    NPO Diet NPO Type: Strict NPO    Continuous Pulse Oximetry    Vital Signs    Oxygen Therapy- Nasal Cannula; Titrate 1-6 LPM Per SpO2; 90 - 95%    POC Glucose Once    Insert Peripheral IV    Seizure Precautions    CBC & Differential    Green Top (Gel)    Lavender Top    Gold Top - SST    Light Blue Top       Medications Given in the Emergency Department:  Medications   sodium chloride 0.9 % flush 10 mL (has no administration in time range)   levETIRAcetam (KEPPRA) injection 1,000 mg (1,000 mg Intravenous Not Given 9/1/24 1341)        ED Course:         Labs:    Lab Results (last 24 hours)       Procedure Component Value Units Date/Time    CBC & Differential [[id number]]  (Normal) Collected: 09/01/24 1058    Specimen: Blood Updated: 09/01/24 1106    Narrative:      The following orders were created for panel order CBC & Differential.  Procedure                               Abnormality         Status                     ---------                               -----------         ------                     CBC Auto Differential[[id number]]        Normal              Final result                 Please view results for these tests on the individual orders.    Comprehensive Metabolic Panel [[id number]]  (Abnormal) Collected: 09/01/24 1058    Specimen: Blood Updated: 09/01/24 1125     Glucose 176 mg/dL      BUN 10 mg/dL      Creatinine 1.00 mg/dL      Sodium 138 mmol/L      Potassium 3.8 mmol/L      Chloride 97 mmol/L      CO2 24.3 mmol/L      Calcium 10.0 mg/dL      Total Protein 7.1 g/dL      Albumin 4.6 g/dL      ALT (SGPT) 21 U/L      AST (SGOT) 22 U/L      Alkaline Phosphatase 128 U/L      Total Bilirubin 0.6 mg/dL      Globulin 2.5 gm/dL      A/G Ratio 1.8 g/dL      BUN/Creatinine Ratio 10.0     Anion Gap 16.7 mmol/L      eGFR 100.0 mL/min/1.73     Narrative:      GFR Normal >60  Chronic Kidney Disease <60  Kidney Failure <15      CBC Auto Differential [[id number]]  (Normal)  Collected: 09/01/24 1058    Specimen: Blood Updated: 09/01/24 1106     WBC 4.60 10*3/mm3      RBC 4.64 10*6/mm3      Hemoglobin 14.9 g/dL      Hematocrit 43.3 %      MCV 93.3 fL      MCH 32.1 pg      MCHC 34.4 g/dL      RDW 12.3 %      RDW-SD 42.7 fl      MPV 10.0 fL      Platelets 221 10*3/mm3      Neutrophil % 67.3 %      Lymphocyte % 21.1 %      Monocyte % 10.4 %      Eosinophil % 0.4 %      Basophil % 0.4 %      Immature Grans % 0.4 %      Neutrophils, Absolute 3.09 10*3/mm3      Lymphocytes, Absolute 0.97 10*3/mm3      Monocytes, Absolute 0.48 10*3/mm3      Eosinophils, Absolute 0.02 10*3/mm3      Basophils, Absolute 0.02 10*3/mm3      Immature Grans, Absolute 0.02 10*3/mm3      nRBC 0.0 /100 WBC     Urine Drug Screen - Urine, Clean Catch [[id number]]  (Abnormal) Collected: 09/01/24 1128    Specimen: Urine, Clean Catch Updated: 09/01/24 1201     Amphet/Methamphet, Screen Negative     Barbiturates Screen, Urine Negative     Benzodiazepine Screen, Urine Positive     Cocaine Screen, Urine Negative     Opiate Screen Negative     THC, Screen, Urine Positive     Methadone Screen, Urine Negative     Oxycodone Screen, Urine Negative     Fentanyl, Urine Positive    Narrative:      Negative Thresholds Per Drugs Screened:    Amphetamines                 500 ng/ml  Barbiturates                 200 ng/ml  Benzodiazepines              100 ng/ml  Cocaine                      300 ng/ml  Methadone                    300 ng/ml  Opiates                      300 ng/ml  Oxycodone                    100 ng/ml  THC                           50 ng/ml  Fentanyl                       5 ng/ml      The Normal Value for all drugs tested is negative. This report includes final unconfirmed screening results to be used for medical treatment purposes only. Unconfirmed results must not be used for non-medical purposes such as employment or legal testing. Clinical consideration should be applied to any drug of abuse test, particularly when  unconfirmed results are used.            POC Glucose Once [[id number]]  (Abnormal) Collected: 09/01/24 1230    Specimen: Blood Updated: 09/01/24 1231     Glucose 100 mg/dL      Comment: Serial Number: [id number]:  [id number]                Imaging:    No Radiology Exams Resulted Within Past 24 Hours      Differential Diagnosis and Discussion:    Seizure: Differential diagnosis includes but is not limited to meningitis, hypoglycemia, electrolyte abnormalities, intracranial hemorrhage, toxin induced, and pseudoseizure.    All labs were reviewed and interpreted by me.    MDM  Patient received a prescription for Keppra, he refused an IV loading dose.  Patient declined going to a detox/rehab facility today due to his having a dental appointment for next week.  Patient has withdrawal medication already and a prescription for Keppra, although an additional Keppra prescription was provided.  Patient's mother is present.  Patient is stable for discharge home.      Patient Care Considerations:  Considered arranging for the patient to go to a substance abuse detox/rehab, however, the patient is declining at this time and will follow-up on his own.  He currently has a prescription for Lucemyra for opiate withdrawal.        Consultants/Shared Management Plan:    Patient discussed with and evaluated by emergency department .  She provided him with a list of outpatient resources.    Social Determinants of Health:    Patient has presented with family members who are responsible, reliable and will ensure follow up care.      Disposition and Care Coordination:    Discharged: The patient is suitable and stable for discharge with no need for consideration of admission.    I have explained the patient´s condition, diagnoses and treatment plan based on the information available to me at this time. I have answered questions and addressed any concerns. The patient has a good  understanding of the patient´s diagnosis,  condition, and treatment plan as can be expected at this point. The vital signs have been stable. The patient´s condition is stable and appropriate for discharge from the emergency department.      The patient will pursue further outpatient evaluation with the primary care physician or other designated or consulting physician as outlined in the discharge instructions. They are agreeable to this plan of care and follow-up instructions have been explained in detail. The patient has received these instructions in written format and has expressed an understanding of the discharge instructions. The patient is aware that any significant change in condition or worsening of symptoms should prompt an immediate return to this or the closest emergency department or call to 911.  I have explained discharge medications and the need for follow up with the patient/caretakers. This was also printed in the discharge instructions. Patient was discharged with the following medications and follow up:      Medication List        New Prescriptions      ondansetron ODT 4 MG disintegrating tablet  Commonly known as: ZOFRAN-ODT  Place 1 tablet on the tongue Every 6 (Six) Hours As Needed for Nausea or Vomiting.            Changed      * levETIRAcetam 500 MG tablet  Commonly known as: KEPPRA  Take 1 tablet by mouth Every 12 (Twelve) Hours.  What changed: Another medication with the same name was added. Make sure you understand how and when to take each.     * levETIRAcetam 500 MG tablet  Commonly known as: KEPPRA  Take 1 tablet by mouth 2 (Two) Times a Day.  What changed: You were already taking a medication with the same name, and this prescription was added. Make sure you understand how and when to take each.           * This list has 2 medication(s) that are the same as other medications prescribed for you. Read the directions carefully, and ask your doctor or other care provider to review them with you.                   Where to Get Your  Medications        These medications were sent to [id number] - [place], [place] - [address] AT [facility] OF [address] [facility] & [place] - [phone] PH - [phone] FX  [address] [place] [address]      Phone: [phone]   levETIRAcetam 500 MG tablet  ondansetron ODT 4 MG disintegrating tablet      Oscar French Jr., MD  [address] [place] [address] [phone]             Final diagnoses:   Seizure   Substance abuse withdrawal, uncomplicated        ED Disposition       ED Disposition   Discharge    Condition   Stable    Comment   --               This medical record created using voice recognition software.             Miguel Stark DO  09/01/24 1416

## 2024-09-24 ENCOUNTER — HOSPITAL ENCOUNTER (EMERGENCY)
Facility: HOSPITAL | Age: 34
Discharge: OTHER INSTITUTION | End: 2024-09-24
Attending: EMERGENCY MEDICINE
Payer: MEDICAID

## 2024-09-24 ENCOUNTER — APPOINTMENT (OUTPATIENT)
Dept: GENERAL RADIOLOGY | Facility: HOSPITAL | Age: 34
End: 2024-09-24
Payer: MEDICAID

## 2024-09-24 ENCOUNTER — APPOINTMENT (OUTPATIENT)
Dept: CT IMAGING | Facility: HOSPITAL | Age: 34
End: 2024-09-24
Payer: MEDICAID

## 2024-09-24 VITALS
RESPIRATION RATE: 18 BRPM | DIASTOLIC BLOOD PRESSURE: 80 MMHG | TEMPERATURE: 98.6 F | WEIGHT: 96.56 LBS | OXYGEN SATURATION: 98 % | SYSTOLIC BLOOD PRESSURE: 125 MMHG | BODY MASS INDEX: 14.3 KG/M2 | HEIGHT: 69 IN | HEART RATE: 49 BPM

## 2024-09-24 DIAGNOSIS — R47.01 APHASIA: ICD-10-CM

## 2024-09-24 DIAGNOSIS — R00.1 BRADYCARDIA: ICD-10-CM

## 2024-09-24 DIAGNOSIS — G40.909 SEIZURE DISORDER: ICD-10-CM

## 2024-09-24 DIAGNOSIS — F19.10 SUBSTANCE ABUSE: ICD-10-CM

## 2024-09-24 DIAGNOSIS — R41.82 ALTERED MENTAL STATUS, UNSPECIFIED ALTERED MENTAL STATUS TYPE: Primary | ICD-10-CM

## 2024-09-24 LAB
ALBUMIN SERPL-MCNC: 4.7 G/DL (ref 3.5–5.2)
ALBUMIN/GLOB SERPL: 1.7 G/DL
ALP SERPL-CCNC: 140 U/L (ref 39–117)
ALT SERPL W P-5'-P-CCNC: 17 U/L (ref 1–41)
AMORPH URATE CRY URNS QL MICRO: ABNORMAL /HPF
AMPHET+METHAMPHET UR QL: NEGATIVE
AMPHETAMINES UR QL: NEGATIVE
ANION GAP SERPL CALCULATED.3IONS-SCNC: 16.9 MMOL/L (ref 5–15)
APAP SERPL-MCNC: <5 MCG/ML (ref 0–30)
AST SERPL-CCNC: 17 U/L (ref 1–40)
BACTERIA UR QL AUTO: ABNORMAL /HPF
BARBITURATES UR QL SCN: NEGATIVE
BASOPHILS # BLD AUTO: 0.02 10*3/MM3 (ref 0–0.2)
BASOPHILS NFR BLD AUTO: 0.3 % (ref 0–1.5)
BENZODIAZ UR QL SCN: NEGATIVE
BILIRUB SERPL-MCNC: 0.5 MG/DL (ref 0–1.2)
BILIRUB UR QL STRIP: NEGATIVE
BUN SERPL-MCNC: 14 MG/DL (ref 6–20)
BUN/CREAT SERPL: 14.7 (ref 7–25)
BUPRENORPHINE SERPL-MCNC: NEGATIVE NG/ML
CALCIUM SPEC-SCNC: 9.9 MG/DL (ref 8.6–10.5)
CANNABINOIDS SERPL QL: NEGATIVE
CHLORIDE SERPL-SCNC: 98 MMOL/L (ref 98–107)
CK SERPL-CCNC: 69 U/L (ref 20–200)
CLARITY UR: ABNORMAL
CO2 SERPL-SCNC: 25.1 MMOL/L (ref 22–29)
COCAINE UR QL: NEGATIVE
COLOR UR: ABNORMAL
CREAT SERPL-MCNC: 0.95 MG/DL (ref 0.76–1.27)
DEPRECATED RDW RBC AUTO: 43 FL (ref 37–54)
EGFRCR SERPLBLD CKD-EPI 2021: 106.4 ML/MIN/1.73
EOSINOPHIL # BLD AUTO: 0 10*3/MM3 (ref 0–0.4)
EOSINOPHIL NFR BLD AUTO: 0 % (ref 0.3–6.2)
ERYTHROCYTE [DISTWIDTH] IN BLOOD BY AUTOMATED COUNT: 12.4 % (ref 12.3–15.4)
ETHANOL BLD-MCNC: 11 MG/DL (ref 0–10)
ETHANOL UR QL: 0.01 %
FENTANYL UR-MCNC: POSITIVE NG/ML
GLOBULIN UR ELPH-MCNC: 2.8 GM/DL
GLUCOSE SERPL-MCNC: 199 MG/DL (ref 65–99)
GLUCOSE UR STRIP-MCNC: NEGATIVE MG/DL
HCT VFR BLD AUTO: 46.1 % (ref 37.5–51)
HGB BLD-MCNC: 15.4 G/DL (ref 13–17.7)
HGB UR QL STRIP.AUTO: NEGATIVE
HOLD SPECIMEN: NORMAL
HYALINE CASTS UR QL AUTO: ABNORMAL /LPF
IMM GRANULOCYTES # BLD AUTO: 0.05 10*3/MM3 (ref 0–0.05)
IMM GRANULOCYTES NFR BLD AUTO: 0.6 % (ref 0–0.5)
INR PPP: 1.11 (ref 0.86–1.15)
KETONES UR QL STRIP: ABNORMAL
LEUKOCYTE ESTERASE UR QL STRIP.AUTO: NEGATIVE
LYMPHOCYTES # BLD AUTO: 0.52 10*3/MM3 (ref 0.7–3.1)
LYMPHOCYTES NFR BLD AUTO: 6.7 % (ref 19.6–45.3)
MCH RBC QN AUTO: 31.3 PG (ref 26.6–33)
MCHC RBC AUTO-ENTMCNC: 33.4 G/DL (ref 31.5–35.7)
MCV RBC AUTO: 93.7 FL (ref 79–97)
METHADONE UR QL SCN: NEGATIVE
MONOCYTES # BLD AUTO: 0.67 10*3/MM3 (ref 0.1–0.9)
MONOCYTES NFR BLD AUTO: 8.6 % (ref 5–12)
NEUTROPHILS NFR BLD AUTO: 6.54 10*3/MM3 (ref 1.7–7)
NEUTROPHILS NFR BLD AUTO: 83.8 % (ref 42.7–76)
NITRITE UR QL STRIP: NEGATIVE
NRBC BLD AUTO-RTO: 0 /100 WBC (ref 0–0.2)
OPIATES UR QL: POSITIVE
OXYCODONE UR QL SCN: NEGATIVE
PCP UR QL SCN: NEGATIVE
PH UR STRIP.AUTO: 7 [PH] (ref 5–8)
PLATELET # BLD AUTO: 340 10*3/MM3 (ref 140–450)
PMV BLD AUTO: 10.2 FL (ref 6–12)
POTASSIUM SERPL-SCNC: 3.6 MMOL/L (ref 3.5–5.2)
PROT SERPL-MCNC: 7.5 G/DL (ref 6–8.5)
PROT UR QL STRIP: ABNORMAL
PROTHROMBIN TIME: 14.6 SECONDS (ref 11.8–14.9)
QT INTERVAL: 516 MS
QTC INTERVAL: 425 MS
RBC # BLD AUTO: 4.92 10*6/MM3 (ref 4.14–5.8)
RBC # UR STRIP: ABNORMAL /HPF
REF LAB TEST METHOD: ABNORMAL
SALICYLATES SERPL-MCNC: <0.3 MG/DL
SODIUM SERPL-SCNC: 140 MMOL/L (ref 136–145)
SP GR UR STRIP: 1.03 (ref 1–1.03)
SQUAMOUS #/AREA URNS HPF: ABNORMAL /HPF
TRICYCLICS UR QL SCN: NEGATIVE
UROBILINOGEN UR QL STRIP: ABNORMAL
WBC # UR STRIP: ABNORMAL /HPF
WBC NRBC COR # BLD AUTO: 7.8 10*3/MM3 (ref 3.4–10.8)

## 2024-09-24 PROCEDURE — 81001 URINALYSIS AUTO W/SCOPE: CPT | Performed by: EMERGENCY MEDICINE

## 2024-09-24 PROCEDURE — 82550 ASSAY OF CK (CPK): CPT | Performed by: EMERGENCY MEDICINE

## 2024-09-24 PROCEDURE — 25010000002 LEVETRIRACETAM PER 10 MG: Performed by: EMERGENCY MEDICINE

## 2024-09-24 PROCEDURE — 36415 COLL VENOUS BLD VENIPUNCTURE: CPT

## 2024-09-24 PROCEDURE — 80179 DRUG ASSAY SALICYLATE: CPT | Performed by: EMERGENCY MEDICINE

## 2024-09-24 PROCEDURE — 93010 ELECTROCARDIOGRAM REPORT: CPT | Performed by: INTERNAL MEDICINE

## 2024-09-24 PROCEDURE — 80143 DRUG ASSAY ACETAMINOPHEN: CPT | Performed by: EMERGENCY MEDICINE

## 2024-09-24 PROCEDURE — 25010000002 LORAZEPAM PER 2 MG: Performed by: EMERGENCY MEDICINE

## 2024-09-24 PROCEDURE — 85610 PROTHROMBIN TIME: CPT | Performed by: EMERGENCY MEDICINE

## 2024-09-24 PROCEDURE — 80053 COMPREHEN METABOLIC PANEL: CPT | Performed by: EMERGENCY MEDICINE

## 2024-09-24 PROCEDURE — 96375 TX/PRO/DX INJ NEW DRUG ADDON: CPT

## 2024-09-24 PROCEDURE — 25010000002 THIAMINE PER 100 MG: Performed by: EMERGENCY MEDICINE

## 2024-09-24 PROCEDURE — 70450 CT HEAD/BRAIN W/O DYE: CPT

## 2024-09-24 PROCEDURE — 96374 THER/PROPH/DIAG INJ IV PUSH: CPT

## 2024-09-24 PROCEDURE — 85025 COMPLETE CBC W/AUTO DIFF WBC: CPT | Performed by: EMERGENCY MEDICINE

## 2024-09-24 PROCEDURE — 82077 ASSAY SPEC XCP UR&BREATH IA: CPT | Performed by: EMERGENCY MEDICINE

## 2024-09-24 PROCEDURE — 87086 URINE CULTURE/COLONY COUNT: CPT | Performed by: EMERGENCY MEDICINE

## 2024-09-24 PROCEDURE — 25810000003 SODIUM CHLORIDE 0.9 % SOLUTION: Performed by: EMERGENCY MEDICINE

## 2024-09-24 PROCEDURE — 96376 TX/PRO/DX INJ SAME DRUG ADON: CPT

## 2024-09-24 PROCEDURE — 99214 OFFICE O/P EST MOD 30 MIN: CPT | Performed by: INTERNAL MEDICINE

## 2024-09-24 PROCEDURE — 25010000002 ONDANSETRON PER 1 MG: Performed by: EMERGENCY MEDICINE

## 2024-09-24 PROCEDURE — 93005 ELECTROCARDIOGRAM TRACING: CPT | Performed by: EMERGENCY MEDICINE

## 2024-09-24 PROCEDURE — 71045 X-RAY EXAM CHEST 1 VIEW: CPT

## 2024-09-24 PROCEDURE — 80307 DRUG TEST PRSMV CHEM ANLYZR: CPT | Performed by: EMERGENCY MEDICINE

## 2024-09-24 PROCEDURE — 99285 EMERGENCY DEPT VISIT HI MDM: CPT

## 2024-09-24 PROCEDURE — 25010000002 NALOXONE HCL 2 MG/2ML SOLUTION PREFILLED SYRINGE: Performed by: EMERGENCY MEDICINE

## 2024-09-24 RX ORDER — LEVETIRACETAM 500 MG/5ML
1500 INJECTION, SOLUTION, CONCENTRATE INTRAVENOUS ONCE
Status: COMPLETED | OUTPATIENT
Start: 2024-09-24 | End: 2024-09-24

## 2024-09-24 RX ORDER — THIAMINE HYDROCHLORIDE 100 MG/ML
100 INJECTION, SOLUTION INTRAMUSCULAR; INTRAVENOUS ONCE
Status: COMPLETED | OUTPATIENT
Start: 2024-09-24 | End: 2024-09-24

## 2024-09-24 RX ORDER — LORAZEPAM 2 MG/ML
2 INJECTION INTRAMUSCULAR ONCE
Status: COMPLETED | OUTPATIENT
Start: 2024-09-24 | End: 2024-09-24

## 2024-09-24 RX ORDER — LORAZEPAM 2 MG/ML
1 INJECTION INTRAMUSCULAR ONCE
Status: COMPLETED | OUTPATIENT
Start: 2024-09-24 | End: 2024-09-24

## 2024-09-24 RX ORDER — ONDANSETRON 2 MG/ML
4 INJECTION INTRAMUSCULAR; INTRAVENOUS ONCE
Status: COMPLETED | OUTPATIENT
Start: 2024-09-24 | End: 2024-09-24

## 2024-09-24 RX ORDER — NALOXONE HYDROCHLORIDE 1 MG/ML
2 INJECTION INTRAMUSCULAR; INTRAVENOUS; SUBCUTANEOUS ONCE
Status: COMPLETED | OUTPATIENT
Start: 2024-09-24 | End: 2024-09-24

## 2024-09-24 RX ORDER — LEVETIRACETAM 500 MG/5ML
1000 INJECTION, SOLUTION, CONCENTRATE INTRAVENOUS ONCE
Status: COMPLETED | OUTPATIENT
Start: 2024-09-24 | End: 2024-09-24

## 2024-09-24 RX ADMIN — ONDANSETRON 4 MG: 2 INJECTION INTRAMUSCULAR; INTRAVENOUS at 13:41

## 2024-09-24 RX ADMIN — ONDANSETRON 4 MG: 2 INJECTION INTRAMUSCULAR; INTRAVENOUS at 15:28

## 2024-09-24 RX ADMIN — NALOXONE HYDROCHLORIDE 2 MG: 1 INJECTION PARENTERAL at 15:16

## 2024-09-24 RX ADMIN — THIAMINE HYDROCHLORIDE 100 MG: 100 INJECTION, SOLUTION INTRAMUSCULAR; INTRAVENOUS at 16:31

## 2024-09-24 RX ADMIN — LEVETIRACETAM 1000 MG: 100 INJECTION, SOLUTION INTRAVENOUS at 16:32

## 2024-09-24 RX ADMIN — SODIUM CHLORIDE 1000 ML: 9 INJECTION, SOLUTION INTRAVENOUS at 14:11

## 2024-09-24 RX ADMIN — ONDANSETRON 4 MG: 2 INJECTION INTRAMUSCULAR; INTRAVENOUS at 14:08

## 2024-09-24 RX ADMIN — LORAZEPAM 1 MG: 2 INJECTION INTRAMUSCULAR; INTRAVENOUS at 15:28

## 2024-09-24 RX ADMIN — SODIUM CHLORIDE 1000 ML: 9 INJECTION, SOLUTION INTRAVENOUS at 11:46

## 2024-09-24 RX ADMIN — SODIUM CHLORIDE 1000 ML: 9 INJECTION, SOLUTION INTRAVENOUS at 13:39

## 2024-09-24 RX ADMIN — LEVETIRACETAM 1500 MG: 100 INJECTION, SOLUTION INTRAVENOUS at 16:58

## 2024-09-24 RX ADMIN — LORAZEPAM 2 MG: 2 INJECTION INTRAMUSCULAR; INTRAVENOUS at 17:21

## 2024-09-24 RX ADMIN — LORAZEPAM 1 MG: 2 INJECTION INTRAMUSCULAR; INTRAVENOUS at 16:42

## 2024-09-24 NOTE — ED PROVIDER NOTES
Time: 11:15 AM EDT  Date of encounter:  9/24/2024  Independent Historian/Clinical History and Information was obtained by:   Patient and Family    History is limited by: Altered Mental Status    Chief Complaint: Substance abuse, altered mental status      History of Present Illness:  Patient is a 34 y.o. year old male who presents to the emergency department for evaluation of substance abuse and altered mental status.  This patient presents to the emergency department after he was initially taken by family member to [facility] for detoxing from narcotics.  The patient was sent to the emergency department because the personnel at [facility] indicated that the patient was not stable for admission and needed further evaluation at a medical facility.  The patient's mother states that he did receive methadone recently and he has had episodes where he is not responding normally.  He has aphasia, generalized weakness, and episodes of intermittent tremors.  The patient does have a history of substance abuse as well as seizures.  He is supposed to be taking Keppra and Trileptal and he is also listed as taking Neurontin.  It is unknown whether he is actually been taking his medications.  Currently the patient has no complaints however he is lethargic and does not respond appropriately      Patient Care Team  Primary Care Provider: Provider, No Known    Past Medical History:     Allergies   Allergen Reactions    Tetanus Toxoids Anaphylaxis and Angioedema     Past Medical History:   Diagnosis Date    Seizures      Past Surgical History:   Procedure Laterality Date    FRACTURE SURGERY      HIP SURGERY Right      History reviewed. No pertinent family history.    Home Medications:  Prior to Admission medications    Medication Sig Start Date End Date Taking? Authorizing Provider   acetaminophen (TYLENOL) 325 MG tablet Take 2 tablets by mouth Every 6 (Six) Hours As Needed for Mild Pain.    Provider, Historical, MD   azithromycin  "(ZITHROMAX) 250 MG tablet Take 1 tablet by mouth Take As Directed. TAKE 2 TABLETS BY MOUTH ON DAY 1, THEN TAKE 1 TABLET DAILY ON DAYS 2 TO 5 2/13/24   Juana Coronado MD   buprenorphine-naloxone (SUBOXONE) 8-2 MG film film Place 2.5 films under the tongue Daily. 5/18/23   Juana Coronado MD   diazePAM (DIASTAT) 2.5 MG gel Insert 2.5 mg into the rectum 1 (One) Time As Needed.    Juana Coronado MD   gabapentin (NEURONTIN) 800 MG tablet Take 1 tablet by mouth 3 (Three) Times a Day. 5/18/23   Juana Coronado MD   ibuprofen (ADVIL,MOTRIN) 800 MG tablet Take 1 tablet by mouth Every 8 (Eight) Hours As Needed for Mild Pain. 2/13/24   Juana Coornado MD   levETIRAcetam (KEPPRA) 500 MG tablet Take 1 tablet by mouth Every 12 (Twelve) Hours. 8/17/24   Aly Shen MD   levETIRAcetam (KEPPRA) 500 MG tablet Take 1 tablet by mouth 2 (Two) Times a Day. 9/1/24   Miguel Stark DO   ondansetron ODT (ZOFRAN-ODT) 4 MG disintegrating tablet Place 1 tablet on the tongue Every 6 (Six) Hours As Needed for Nausea or Vomiting. 9/1/24   Miguel Stark DO   OXcarbazepine (TRILEPTAL) 600 MG tablet Take 1 tablet by mouth Every 12 (Twelve) Hours. 2/8/24   Juana Coronado MD        Social History:   Social History     Tobacco Use    Smoking status: Every Day     Current packs/day: 1.00     Average packs/day: 1 pack/day for 10.0 years (10.0 ttl pk-yrs)     Types: Cigarettes    Smokeless tobacco: Never   Vaping Use    Vaping status: Never Used   Substance Use Topics    Alcohol use: Never    Drug use: Not Currently         Review of Systems:  Review of Systems   Unable to perform ROS: Mental status change        Physical Exam:  /80 (BP Location: Left arm, Patient Position: Lying)   Pulse (!) 49   Temp 98.6 °F (37 °C) (Oral)   Resp 18   Ht 175.3 cm (69\")   Wt 43.8 kg (96 lb 9 oz)   SpO2 98%   BMI 14.26 kg/m²     Physical Exam  Vitals and nursing note reviewed.   Constitutional:       General: He " is not in acute distress.     Appearance: Normal appearance. He is not toxic-appearing.      Comments: Lethargic, disoriented, dysarthric.   HENT:      Head: Normocephalic and atraumatic.      Right Ear: External ear normal.      Left Ear: External ear normal.      Nose: Nose normal.      Mouth/Throat:      Mouth: Mucous membranes are moist.      Pharynx: Oropharynx is clear.   Eyes:      General: No scleral icterus.     Extraocular Movements: Extraocular movements intact.      Pupils: Pupils are equal, round, and reactive to light.   Cardiovascular:      Rate and Rhythm: Regular rhythm. Bradycardia present.      Pulses: Normal pulses.      Heart sounds: Normal heart sounds.   Pulmonary:      Effort: Pulmonary effort is normal. No respiratory distress.      Breath sounds: Normal breath sounds.   Abdominal:      General: Abdomen is flat.      Palpations: Abdomen is soft.      Tenderness: There is no abdominal tenderness.   Musculoskeletal:         General: Normal range of motion.      Cervical back: Normal range of motion and neck supple.   Skin:     General: Skin is warm and dry.      Capillary Refill: Capillary refill takes less than 2 seconds.   Neurological:      Mental Status: He is alert.      Comments: The patient is lethargic and dysarthric.  He has generalized weakness but no focal findings.                  Procedures:  Procedures      Medical Decision Making:      Comorbidities that affect care:    Substance Abuse    External Notes reviewed:    Previous Clinic Note: Urgent care visit for dental abscess.      The following orders were placed and all results were independently analyzed by me:  Orders Placed This Encounter   Procedures    Urine Culture - Urine,    XR Chest 1 View    CT Head Without Contrast    Comprehensive Metabolic Panel    Protime-INR    Urinalysis With Culture If Indicated - Urine, Clean Catch    Acetaminophen Level    Ethanol    Urine Drug Screen - Urine, Clean Catch    Salicylate Level     CK    CBC Auto Differential    Fentanyl, Urine - Urine, Clean Catch    Urinalysis, Microscopic Only - Urine, Clean Catch    ECG 12 Lead Rhythm Change    CBC & Differential    Extra Tubes    Gold Top - SST       Medications Given in the Emergency Department:  Medications   sodium chloride 0.9 % bolus 1,000 mL (0 mL Intravenous Stopped 9/24/24 1253)   sodium chloride 0.9 % bolus 1,000 mL (0 mL Intravenous Stopped 9/24/24 1445)   ondansetron (ZOFRAN) injection 4 mg (4 mg Intravenous Given 9/24/24 1341)   ondansetron (ZOFRAN) injection 4 mg (4 mg Intravenous Given 9/24/24 1408)   sodium chloride 0.9 % bolus 1,000 mL (0 mL Intravenous Stopped 9/24/24 1627)   Naloxone HCl (NARCAN) injection 2 mg (2 mg Intravenous Given 9/24/24 1516)   ondansetron (ZOFRAN) injection 4 mg (4 mg Intravenous Given 9/24/24 1528)   LORazepam (ATIVAN) injection 1 mg (1 mg Intravenous Given 9/24/24 1528)   thiamine (B-1) injection 100 mg (100 mg Intravenous Given 9/24/24 1631)   levETIRAcetam (KEPPRA) injection 1,000 mg (1,000 mg Intravenous Given 9/24/24 1632)   levETIRAcetam (KEPPRA) injection 1,500 mg (1,500 mg Intravenous Given 9/24/24 1658)   LORazepam (ATIVAN) injection 1 mg (1 mg Intravenous Given 9/24/24 1642)   LORazepam (ATIVAN) injection 2 mg (2 mg Intravenous Given 9/24/24 1721)        ED Course:    ED Course as of 09/24/24 2238   Tue Sep 24, 2024   1734 I discussed patient with Radha Betancourt of the neurology service at [facility] who is excepted the patient for Dr. Diallo to an ICU level bed at the [facility] location.  Currently awaiting bed placement.  Patient's vital signs and respiratory status are safe for transport at this time. [JS]      ED Course User Index  [JS] Og Mojica MD     EKG: Sinus bradycardia the rate of 41 beats per  Normal P wave and ID interval  Normal QRS   no acute ischemia.    Labs:    Lab Results (last 24 hours)       Procedure Component Value Units Date/Time    CBC & Differential [[id number]]   (Abnormal) Collected: 09/24/24 1123    Specimen: Blood Updated: 09/24/24 1132    Narrative:      The following orders were created for panel order CBC & Differential.  Procedure                               Abnormality         Status                     ---------                               -----------         ------                     CBC Auto Differential[[id number]]        Abnormal            Final result                 Please view results for these tests on the individual orders.    Comprehensive Metabolic Panel [[id number]]  (Abnormal) Collected: 09/24/24 1123    Specimen: Blood Updated: 09/24/24 1149     Glucose 199 mg/dL      BUN 14 mg/dL      Creatinine 0.95 mg/dL      Sodium 140 mmol/L      Potassium 3.6 mmol/L      Chloride 98 mmol/L      CO2 25.1 mmol/L      Calcium 9.9 mg/dL      Total Protein 7.5 g/dL      Albumin 4.7 g/dL      ALT (SGPT) 17 U/L      AST (SGOT) 17 U/L      Alkaline Phosphatase 140 U/L      Total Bilirubin 0.5 mg/dL      Globulin 2.8 gm/dL      A/G Ratio 1.7 g/dL      BUN/Creatinine Ratio 14.7     Anion Gap 16.9 mmol/L      eGFR 106.4 mL/min/1.73     Narrative:      GFR Normal >60  Chronic Kidney Disease <60  Kidney Failure <15      Protime-INR [[id number]]  (Normal) Collected: 09/24/24 1123    Specimen: Blood Updated: 09/24/24 1140     Protime 14.6 Seconds      INR 1.11    Narrative:      Suggested Therapeutic Ranges For Oral Anticoagulant Therapy:  Level of Therapy                      INR Target Range  Standard Dose                            2.0-3.0  High Dose                                2.5-3.5  Patients not receiving anticoagulant  Therapy Normal Range                     0.86-1.15    Acetaminophen Level [[id number]]  (Normal) Collected: 09/24/24 1123    Specimen: Blood Updated: 09/24/24 1149     Acetaminophen <5.0 mcg/mL     Ethanol [[id number]]  (Abnormal) Collected: 09/24/24 1123    Specimen: Blood Updated: 09/24/24 1145     Ethanol 11 mg/dL      Ethanol % 0.011 %      Narrative:      Ethanol (Plasma)  <10 Essentially Negative    Toxic Concentrations           mg/dL    Flushing, slowing of reflexes    Impaired visual activity         Depression of CNS              >100  Possible Coma                  >300       Salicylate Level [[id number]]  (Normal) Collected: 09/24/24 1123    Specimen: Blood Updated: 09/24/24 1149     Salicylate <0.3 mg/dL     CK [[id number]]  (Normal) Collected: 09/24/24 1123    Specimen: Blood Updated: 09/24/24 1149     Creatine Kinase 69 U/L     CBC Auto Differential [[id number]]  (Abnormal) Collected: 09/24/24 1123    Specimen: Blood Updated: 09/24/24 1132     WBC 7.80 10*3/mm3      RBC 4.92 10*6/mm3      Hemoglobin 15.4 g/dL      Hematocrit 46.1 %      MCV 93.7 fL      MCH 31.3 pg      MCHC 33.4 g/dL      RDW 12.4 %      RDW-SD 43.0 fl      MPV 10.2 fL      Platelets 340 10*3/mm3      Neutrophil % 83.8 %      Lymphocyte % 6.7 %      Monocyte % 8.6 %      Eosinophil % 0.0 %      Basophil % 0.3 %      Immature Grans % 0.6 %      Neutrophils, Absolute 6.54 10*3/mm3      Lymphocytes, Absolute 0.52 10*3/mm3      Monocytes, Absolute 0.67 10*3/mm3      Eosinophils, Absolute 0.00 10*3/mm3      Basophils, Absolute 0.02 10*3/mm3      Immature Grans, Absolute 0.05 10*3/mm3      nRBC 0.0 /100 WBC     Urinalysis With Culture If Indicated - Urine, Clean Catch [[id number]]  (Abnormal) Collected: 09/24/24 1445    Specimen: Urine, Clean Catch Updated: 09/24/24 1459     Color, UA Dark Yellow     Appearance, UA Turbid     pH, UA 7.0     Specific Gravity, UA 1.027     Glucose, UA Negative     Ketones, UA 15 mg/dL (1+)     Bilirubin, UA Negative     Blood, UA Negative     Protein, UA 30 mg/dL (1+)     Leuk Esterase, UA Negative     Nitrite, UA Negative     Urobilinogen, UA 1.0 E.U./dL    Narrative:      In absence of clinical symptoms, the presence of pyuria, bacteria, and/or nitrites on the urinalysis result does not correlate with infection.    Urine Drug Screen -  Urine, Clean Catch [[id number]]  (Abnormal) Collected: 09/24/24 1445    Specimen: Urine, Clean Catch Updated: 09/24/24 1502     THC, Screen, Urine Negative     Phencyclidine (PCP), Urine Negative     Cocaine Screen, Urine Negative     Methamphetamine, Ur Negative     Opiate Screen Positive     Amphetamine Screen, Urine Negative     Benzodiazepine Screen, Urine Negative     Tricyclic Antidepressants Screen Negative     Methadone Screen, Urine Negative     Barbiturates Screen, Urine Negative     Oxycodone Screen, Urine Negative     Buprenorphine, Screen, Urine Negative    Narrative:      Cutoff For Drugs Screened:    Amphetamines               500 ng/ml  Barbiturates               200 ng/ml  Benzodiazepines            150 ng/ml  Cocaine                    150 ng/ml  Methadone                  200 ng/ml  Opiates                    100 ng/ml  Phencyclidine               25 ng/ml  THC                         50 ng/ml  Methamphetamine            500 ng/ml  Tricyclic Antidepressants  300 ng/ml  Oxycodone                  100 ng/ml  Buprenorphine               10 ng/ml    The normal value for all drugs tested is negative. This report includes unconfirmed screening results, with the cutoff values listed, to be used for medical treatment purposes only.  Unconfirmed results must not be used for non-medical purposes such as employment or legal testing.  Clinical consideration should be applied to any drug of abuse test, particularly when unconfirmed results are used.      Fentanyl, Urine - Urine, Clean Catch [[id number]]  (Abnormal) Collected: 09/24/24 1445    Specimen: Urine, Clean Catch Updated: 09/24/24 1505     Fentanyl, Urine Positive    Narrative:      Negative Threshold:      Fentanyl 5 ng/mL     The normal value for the drug tested is negative. This report includes final unconfirmed screening results to be used for medical treatment purposes only. Unconfirmed results must not be used for non-medical purposes such as  employment or legal testing. Clinical consideration should be applied to any drug of abuse test, particularly when unconfirmed results are used.           Urinalysis, Microscopic Only - Urine, Clean Catch [[id number]]  (Abnormal) Collected: 09/24/24 1445    Specimen: Urine, Clean Catch Updated: 09/24/24 1536     RBC, UA None Seen /HPF      WBC, UA None Seen /HPF      Bacteria, UA Trace /HPF      Squamous Epithelial Cells, UA None Seen /HPF      Hyaline Casts, UA None Seen /LPF      Amorphous Crystals, UA Small/1+ /HPF      Methodology Manual Light Microscopy    Urine Culture - Urine, Urine, Clean Catch [[id number]] Collected: 09/24/24 1445    Specimen: Urine, Clean Catch Updated: 09/24/24 1453             Imaging:    CT Head Without Contrast    Result Date: 9/24/2024  CT HEAD WO CONTRAST HISTORY: Altered mental status. Uncontrolled shaking and jerking. TECHNIQUE: Axial unenhanced head CT with multiplanar reformats. Radiation dose reduction techniques included automated exposure control or exposure modulation based on body size. COMPARISON: 8/17/2024 FINDINGS: Exam is motion degraded. Ventricular size and configuration are normal. No acute infarct or hemorrhage is identified. There are no masses. There is no skull fracture. There is acute right maxillary sinusitis with an air-fluid level. There is also evidence of acute left sphenoid sinusitis.     1. Motion-degraded exam, but no acute findings in the brain. 2. Acute right maxillary and left sphenoid sinusitis. Electronically Signed: Ben Chinchilla MD  9/24/2024 3:14 PM EDT  Workstation ID: [id number]    XR Chest 1 View    Result Date: 9/24/2024  XR CHEST 1 VW Date of Exam: 9/24/2024 11:45 AM EDT Indication: Weakness Comparison: 8/17/2024 Findings: Cardiomediastinal silhouette is unremarkable.  No airspace disease, pneumothorax, nor pleural effusion. No acute osseous abnormality identified.     Impression: No acute process identified Electronically Signed: Xavier Sanchez  MD  9/24/2024 11:57 AM EDT  Workstation ID: [id number]       Differential Diagnosis and Discussion:    Altered Mental Status: Based on the patient's signs and symptoms, differential diagnosis includes but is not limited to meningitis, stroke, sepsis, subarachnoid hemorrhage, intracranial bleeding, encephalitis, and metabolic encephalopathy.    All labs were reviewed and interpreted by me.  All X-rays impressions were independently interpreted by me.  EKG was interpreted by me.    MDM  Number of Diagnoses or Management Options  Altered mental status, unspecified altered mental status type  Aphasia  Bradycardia  Seizure disorder  Substance abuse  Diagnosis management comments: Consultation was performed by teleneurologist who is concern for subclinical seizures.  The cerebella was placed on the patient.  The patient was given a status dose of Keppra which is 60 mg/kg.  The patient is also given a milligram of Ativan.  We currently awaiting transfer to [facility] for further evaluation and continuous EEG monitoring       Amount and/or Complexity of Data Reviewed  Clinical lab tests: reviewed  Tests in the radiology section of CPT®: reviewed  Tests in the medicine section of CPT®: reviewed             Total Critical Care time of 45 minutes. Total critical care time documented does not include time spent on separately billed procedures for services of nurses or physician assistants. I personally saw and examined the patient. I have reviewed all diagnostic interpretations and treatment plans as written. I was present for the key portions of any procedures performed and the inclusive time noted in any critical care statement. Critical care time includes patient management by me, time spent at the patients bedside,  time to review lab and imaging results, discussing patient care, documentation in the medical record, and time spent with family or caregiver.          Patient Care Considerations:    MRI: I  considered ordering an MRI however this can be ordered as an inpatient      Consultants/Shared Management Plan:    Hospitalist: I have discussed the case with hospitalist who agrees to accept the patient for admission.  Consultant: I have discussed the case with teleneurology who states they will consult    Social Determinants of Health:    Patient is unable to carry out activities of daily life. Escalation of care is necessary.       Disposition and Care Coordination:    Admit:   Through independent evaluation of the patient's history, physical, and imperical data, the patient meets criteria for inpatient admission to the hospital.        Final diagnoses:   Altered mental status, unspecified altered mental status type   Aphasia   Bradycardia   Substance abuse   Seizure disorder        ED Disposition       ED Disposition   Transfer to Another Facility     Condition   --    Comment   --               This medical record created using voice recognition software.             Reed Smyth, DO  09/24/24 5181

## 2024-09-24 NOTE — CONSULTS
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 Patient cannot reliably provide history as he is only intermittently attentive and I am concerned for partial status epilepticus vs toxic metabolic encephalopathy; per his mother he has been inattentive/staring since yesterday. Ongoing vomiting and urinary incontinence noted in the ED. No focality on exam other than full body tremulousness/myoclonus and intermittent altered awareness      Recommendations:  Our recommendations are outlined below.    Diagnostic Studies :  MRI brain w/wo contrast   (Concern for other intracranial pathology that requires MRI brain with contrast) Stat EEGContinuous EEG Monitoring  Please order    Medications :  Load Keppra for total of 60 mg/kg Seizure precautions    Nursing Recommendations :  Maintain Euglycemia and Euthermia    DVT Prophylaxis :  Lovenox or LMW Heparin    Disposition :  Neurology will follow      ----------------------------------------------------------------------------------------------------        Metrics:  [facility] Notification Time: 09/24/2024 15:53:18  Stamp Time: 09/24/2024 15:55:11  Callback Response Time: 09/24/2024 15:57:03    Primary Provider Notified of Diagnostic Impression and Management Plan on: 09/24/2024 16:24:53      CT HEAD:  Reviewed  motion degraded, no acute  changes        ----------------------------------------------------------------------------------------------------    Chief Complaint:  34 M History of substance abuse and seizures, unsure if compliant with Keppra. Presents with intermittent lethargy and episodes of staring with shaking. Given Narcan, Ativan, and Keppra. Continues to have episodes. Imaging: CTH negative. Eval and recs.    History of Present Illness:  Patient is a 34 year old Male.  34M with history substance abuse and seizures, his mother at bedside reports 3 seizures in the last year (last was 2 months ago) with full body tonic/clonic movements, however I do see multiple ED chart notes from september, august, may for seizures. Patient cannot reliably provide history as he is only intermittently attentive. His mother says that yeseterday he was vomiting, she thinks he is detoxing, he was admitted to a rehab place but sent him to ED for dehydration. She notes that since yesterday, when he is asked something, he will just stare, he has done that in the past when on drugs, normally he snorts fentanyl. He went to a methadone clinic 3 times last week, but was very spaced out when they got home. She states he nis not taking his Keppra; No known head trauma in the last week. At baseline ADLs intact.  STill vomiting in ED , and urinary incontinence noted       Medications:    No Anticoagulant use   No Antiplatelet use  Reviewed EMR for current medications    Allergies:   Reviewed    Social History:  Unable To Obtain Due To Patient Status : Patient Is Confused    Family History:    There is no family history of premature cerebrovascular disease pertinent to this consultation    ROS :  14 Points Review of Systems was performed and was negative except mentioned in HPI.    Past Surgical History:  There Is No Surgical History Contributory To Today’s Visit       Examination:  BP(128/83), Pulse(61), Blood Glucose(199)  1A: Level of Consciousness - Arouses to  minor stimulation + 1  1B: Ask Month and Age - 1 Question Right + 1  1C: Blink Eyes & Squeeze Hands - Performs Both Tasks + 0  2: Test Horizontal Extraocular Movements - Normal + 0  3: Test Visual Fields - No Visual Loss + 0  4: Test Facial Palsy (Use Grimace if Obtunded) - Normal symmetry + 0  5A: Test Left Arm Motor Drift - Drift, but doesn't hit bed + 1  5B: Test Right Arm Motor Drift - Drift, but doesn't hit bed + 1  6A: Test Left Leg Motor Drift - Drift, but doesn't hit bed + 1  6B: Test Right Leg Motor Drift - Drift, but doesn't hit bed + 1  7: Test Limb Ataxia (FNF/Heel-Shin) - No Ataxia + 0  8: Test Sensation - Mild-Moderate Loss: Less Sharp/More Dull + 1  9: Test Language/Aphasia - Normal; No aphasia + 0  10: Test Dysarthria - Normal + 0  11: Test Extinction/Inattention - No abnormality + 0    NIHSS Score: 7  NIHSS Free Text : Patient diffusely tremulous with suspected myoclonus in all limbs; distracted, will reliably attempt to answer question when it is repeated and his attention is brought back to examiner.    Spoke with : ED physician        This consult was conducted in real time using interactive audio and video technology. Patient was informed of the technology being used for this visit and agreed to proceed. Patient located in hospital and provider located at home/office setting.    Patient is being evaluated for possible acute neurologic impairment and high probability of imminent or life - threatening deterioration.I spent total of 35 minutes providing care to this patient, including time for face to face visit via telemedicine, review of medical records, imaging studies and discussion of findings with providers, the patient and / or family.      Dr Tess Parisi      [facility]  For Inpatient follow-up with [facility] physician please call [facility] [phone]. This is not an outpatient service. Post hospital discharge, please contact hospital directly.    Please do not communicate with  [facility] physicians via secure chat. If you have any questions, Please contact [facility].  Please call or reconsult our service if there are any clinical or diagnostic changes.

## 2024-09-26 LAB — BACTERIA SPEC AEROBE CULT: NO GROWTH

## 2024-10-01 LAB
QT INTERVAL: 516 MS
QTC INTERVAL: 425 MS

## 2024-11-09 ENCOUNTER — APPOINTMENT (OUTPATIENT)
Dept: GENERAL RADIOLOGY | Facility: HOSPITAL | Age: 34
End: 2024-11-09
Payer: MEDICAID

## 2024-11-09 ENCOUNTER — HOSPITAL ENCOUNTER (EMERGENCY)
Facility: HOSPITAL | Age: 34
Discharge: HOME | End: 2024-11-09
Attending: EMERGENCY MEDICINE
Payer: MEDICAID

## 2024-11-09 VITALS
BODY MASS INDEX: 17.2 KG/M2 | HEART RATE: 69 BPM | TEMPERATURE: 98.9 F | OXYGEN SATURATION: 94 % | HEIGHT: 70 IN | WEIGHT: 120.15 LBS | SYSTOLIC BLOOD PRESSURE: 120 MMHG | RESPIRATION RATE: 12 BRPM | DIASTOLIC BLOOD PRESSURE: 81 MMHG

## 2024-11-09 DIAGNOSIS — T40.601A OVERDOSE OF OPIATE OR RELATED NARCOTIC, ACCIDENTAL OR UNINTENTIONAL, INITIAL ENCOUNTER: Primary | ICD-10-CM

## 2024-11-09 LAB
ALBUMIN SERPL-MCNC: 4.6 G/DL (ref 3.5–5.2)
ALBUMIN/GLOB SERPL: 1.8 G/DL
ALP SERPL-CCNC: 102 U/L (ref 39–117)
ALT SERPL W P-5'-P-CCNC: 39 U/L (ref 1–41)
AMPHET+METHAMPHET UR QL: NEGATIVE
AMPHETAMINES UR QL: NEGATIVE
ANION GAP SERPL CALCULATED.3IONS-SCNC: 11.2 MMOL/L (ref 5–15)
APAP SERPL-MCNC: <5 MCG/ML (ref 0–30)
AST SERPL-CCNC: 36 U/L (ref 1–40)
BARBITURATES UR QL SCN: NEGATIVE
BASOPHILS # BLD AUTO: 0.04 10*3/MM3 (ref 0–0.2)
BASOPHILS NFR BLD AUTO: 0.3 % (ref 0–1.5)
BENZODIAZ UR QL SCN: NEGATIVE
BILIRUB SERPL-MCNC: 0.4 MG/DL (ref 0–1.2)
BUN SERPL-MCNC: 14 MG/DL (ref 6–20)
BUN/CREAT SERPL: 16.5 (ref 7–25)
BUPRENORPHINE SERPL-MCNC: NEGATIVE NG/ML
CALCIUM SPEC-SCNC: 9.6 MG/DL (ref 8.6–10.5)
CANNABINOIDS SERPL QL: NEGATIVE
CHLORIDE SERPL-SCNC: 101 MMOL/L (ref 98–107)
CO2 SERPL-SCNC: 29.8 MMOL/L (ref 22–29)
COCAINE UR QL: NEGATIVE
CREAT SERPL-MCNC: 0.85 MG/DL (ref 0.76–1.27)
DEPRECATED RDW RBC AUTO: 49 FL (ref 37–54)
EGFRCR SERPLBLD CKD-EPI 2021: 115.5 ML/MIN/1.73
EOSINOPHIL # BLD AUTO: 0.03 10*3/MM3 (ref 0–0.4)
EOSINOPHIL NFR BLD AUTO: 0.2 % (ref 0.3–6.2)
ERYTHROCYTE [DISTWIDTH] IN BLOOD BY AUTOMATED COUNT: 13.6 % (ref 12.3–15.4)
ETHANOL BLD-MCNC: <10 MG/DL (ref 0–10)
ETHANOL UR QL: <0.01 %
FENTANYL UR-MCNC: POSITIVE NG/ML
GLOBULIN UR ELPH-MCNC: 2.5 GM/DL
GLUCOSE SERPL-MCNC: 128 MG/DL (ref 65–99)
HCT VFR BLD AUTO: 43.3 % (ref 37.5–51)
HGB BLD-MCNC: 14.2 G/DL (ref 13–17.7)
HOLD SPECIMEN: NORMAL
HOLD SPECIMEN: NORMAL
IMM GRANULOCYTES # BLD AUTO: 0.3 10*3/MM3 (ref 0–0.05)
IMM GRANULOCYTES NFR BLD AUTO: 2 % (ref 0–0.5)
LYMPHOCYTES # BLD AUTO: 1.52 10*3/MM3 (ref 0.7–3.1)
LYMPHOCYTES NFR BLD AUTO: 10.1 % (ref 19.6–45.3)
MCH RBC QN AUTO: 31.8 PG (ref 26.6–33)
MCHC RBC AUTO-ENTMCNC: 32.8 G/DL (ref 31.5–35.7)
MCV RBC AUTO: 97.1 FL (ref 79–97)
METHADONE UR QL SCN: NEGATIVE
MONOCYTES # BLD AUTO: 0.71 10*3/MM3 (ref 0.1–0.9)
MONOCYTES NFR BLD AUTO: 4.7 % (ref 5–12)
NEUTROPHILS NFR BLD AUTO: 12.46 10*3/MM3 (ref 1.7–7)
NEUTROPHILS NFR BLD AUTO: 82.7 % (ref 42.7–76)
NRBC BLD AUTO-RTO: 0 /100 WBC (ref 0–0.2)
OPIATES UR QL: NEGATIVE
OXYCODONE UR QL SCN: NEGATIVE
PCP UR QL SCN: NEGATIVE
PLATELET # BLD AUTO: 220 10*3/MM3 (ref 140–450)
PMV BLD AUTO: 9.3 FL (ref 6–12)
POTASSIUM SERPL-SCNC: 3.9 MMOL/L (ref 3.5–5.2)
PROT SERPL-MCNC: 7.1 G/DL (ref 6–8.5)
RBC # BLD AUTO: 4.46 10*6/MM3 (ref 4.14–5.8)
SALICYLATES SERPL-MCNC: <0.3 MG/DL
SODIUM SERPL-SCNC: 142 MMOL/L (ref 136–145)
TRICYCLICS UR QL SCN: NEGATIVE
WBC NRBC COR # BLD AUTO: 15.06 10*3/MM3 (ref 3.4–10.8)
WHOLE BLOOD HOLD COAG: NORMAL
WHOLE BLOOD HOLD SPECIMEN: NORMAL

## 2024-11-09 PROCEDURE — 80179 DRUG ASSAY SALICYLATE: CPT

## 2024-11-09 PROCEDURE — 96374 THER/PROPH/DIAG INJ IV PUSH: CPT

## 2024-11-09 PROCEDURE — 93005 ELECTROCARDIOGRAM TRACING: CPT | Performed by: EMERGENCY MEDICINE

## 2024-11-09 PROCEDURE — 80143 DRUG ASSAY ACETAMINOPHEN: CPT

## 2024-11-09 PROCEDURE — 99284 EMERGENCY DEPT VISIT MOD MDM: CPT

## 2024-11-09 PROCEDURE — 82077 ASSAY SPEC XCP UR&BREATH IA: CPT

## 2024-11-09 PROCEDURE — 25010000002 ONDANSETRON PER 1 MG: Performed by: EMERGENCY MEDICINE

## 2024-11-09 PROCEDURE — 96375 TX/PRO/DX INJ NEW DRUG ADDON: CPT

## 2024-11-09 PROCEDURE — 85025 COMPLETE CBC W/AUTO DIFF WBC: CPT

## 2024-11-09 PROCEDURE — 25010000002 NALOXONE HCL 2 MG/2ML SOLUTION PREFILLED SYRINGE: Performed by: EMERGENCY MEDICINE

## 2024-11-09 PROCEDURE — 93005 ELECTROCARDIOGRAM TRACING: CPT

## 2024-11-09 PROCEDURE — 71045 X-RAY EXAM CHEST 1 VIEW: CPT

## 2024-11-09 PROCEDURE — 80307 DRUG TEST PRSMV CHEM ANLYZR: CPT | Performed by: EMERGENCY MEDICINE

## 2024-11-09 PROCEDURE — 80053 COMPREHEN METABOLIC PANEL: CPT

## 2024-11-09 RX ORDER — NALOXONE HYDROCHLORIDE 1 MG/ML
2 INJECTION INTRAMUSCULAR; INTRAVENOUS; SUBCUTANEOUS ONCE
Status: COMPLETED | OUTPATIENT
Start: 2024-11-09 | End: 2024-11-09

## 2024-11-09 RX ORDER — ONDANSETRON 2 MG/ML
4 INJECTION INTRAMUSCULAR; INTRAVENOUS ONCE
Status: COMPLETED | OUTPATIENT
Start: 2024-11-09 | End: 2024-11-09

## 2024-11-09 RX ORDER — SODIUM CHLORIDE 0.9 % (FLUSH) 0.9 %
10 SYRINGE (ML) INJECTION AS NEEDED
Status: DISCONTINUED | OUTPATIENT
Start: 2024-11-09 | End: 2024-11-09 | Stop reason: HOSPADM

## 2024-11-09 RX ADMIN — ONDANSETRON 4 MG: 2 INJECTION INTRAMUSCULAR; INTRAVENOUS at 15:54

## 2024-11-09 RX ADMIN — NALOXONE HYDROCHLORIDE 2 MG: 1 INJECTION PARENTERAL at 15:53

## 2024-11-09 NOTE — ED PROVIDER NOTES
Time: 12:07 PM EST  Date of encounter:  11/9/2024  Independent Historian/Clinical History and Information was obtained by:   {Blank multiple:02837}    History is limited by: {Limited History:08134}    Chief Complaint: ***      History of Present Illness:  Patient is a 34 y.o. year old male who presents to the emergency department for evaluation of ***      Patient Care Team  Primary Care Provider: Provider, No Known    Past Medical History:     Allergies   Allergen Reactions    Tetanus Toxoids Anaphylaxis and Angioedema     Past Medical History:   Diagnosis Date    Seizures      Past Surgical History:   Procedure Laterality Date    FRACTURE SURGERY      HIP SURGERY Right      History reviewed. No pertinent family history.    Home Medications:  Prior to Admission medications    Medication Sig Start Date End Date Taking? Authorizing Provider   acetaminophen (TYLENOL) 325 MG tablet Take 2 tablets by mouth Every 6 (Six) Hours As Needed for Mild Pain.   Yes Juana Coronado MD   gabapentin (NEURONTIN) 800 MG tablet Take 1 tablet by mouth 3 (Three) Times a Day. 5/18/23  Yes Juaan Coronado MD   ibuprofen (ADVIL,MOTRIN) 800 MG tablet Take 1 tablet by mouth Every 8 (Eight) Hours As Needed for Mild Pain. 2/13/24  Yes Juana Coronado MD   levETIRAcetam (KEPPRA) 500 MG tablet Take 1 tablet by mouth Every 12 (Twelve) Hours. 8/17/24  Yes Aly Shen MD   levETIRAcetam (KEPPRA) 500 MG tablet Take 1 tablet by mouth 2 (Two) Times a Day. 9/1/24  Yes Miguel Stark,    azithromycin (ZITHROMAX) 250 MG tablet Take 1 tablet by mouth Take As Directed. TAKE 2 TABLETS BY MOUTH ON DAY 1, THEN TAKE 1 TABLET DAILY ON DAYS 2 TO 5 2/13/24   Juana Coronado MD   buprenorphine-naloxone (SUBOXONE) 8-2 MG film film Place 2.5 films under the tongue Daily. 5/18/23   Juana Coronado MD   diazePAM (DIASTAT) 2.5 MG gel Insert 2.5 mg into the rectum 1 (One) Time As Needed.    Juana Coronado MD   ondansetron  "ODT (ZOFRAN-ODT) 4 MG disintegrating tablet Place 1 tablet on the tongue Every 6 (Six) Hours As Needed for Nausea or Vomiting. 9/1/24   Miguel Stark DO   OXcarbazepine (TRILEPTAL) 600 MG tablet Take 1 tablet by mouth Every 12 (Twelve) Hours. 2/8/24   Provider, MD Juana        Social History:   Social History     Tobacco Use    Smoking status: Every Day     Current packs/day: 1.00     Average packs/day: 1 pack/day for 10.0 years (10.0 ttl pk-yrs)     Types: Cigarettes    Smokeless tobacco: Never   Vaping Use    Vaping status: Never Used   Substance Use Topics    Alcohol use: Never    Drug use: Not Currently         Review of Systems:  Review of Systems     Physical Exam:  /81 (BP Location: Left arm, Patient Position: Lying)   Pulse 76   Temp 98.1 °F (36.7 °C) (Oral)   Resp 18   Ht 177.8 cm (70\")   Wt 54.5 kg (120 lb 2.4 oz)   SpO2 100%   BMI 17.24 kg/m²     Physical Exam   ***          Procedures:  Procedures      Medical Decision Making:      Comorbidities that affect care:    {Comorbidities that affect care:40721}    External Notes reviewed:    {External Note review (Optional):31866}      The following orders were placed and all results were independently analyzed by me:  Orders Placed This Encounter   Procedures    [place] Draw    Comprehensive Metabolic Panel    Acetaminophen Level    Ethanol    Salicylate Level    Urine Drug Screen - Urine, Clean Catch    CBC Auto Differential    NPO Diet NPO Type: Strict NPO    Continuous Pulse Oximetry    Vital Signs    Undress & Gown    Psych / Access to See    Oxygen Therapy- Nasal Cannula; Titrate 1-6 LPM Per SpO2; 90 - 95%    POC Glucose Once    ECG 12 Lead Drug Monitoring    Insert Peripheral IV    Suicide Precautions    CBC & Differential    Green Top (Gel)    Lavender Top    Gold Top - SST    Light Blue Top       Medications Given in the Emergency Department:  Medications   sodium chloride 0.9 % flush 10 mL (has no administration in time range)    "     ED Course:         Labs:    Lab Results (last 24 hours)       Procedure Component Value Units Date/Time    CBC & Differential [[id number]]  (Abnormal) Collected: 11/09/24 1137    Specimen: Blood Updated: 11/09/24 1144    Narrative:      The following orders were created for panel order CBC & Differential.  Procedure                               Abnormality         Status                     ---------                               -----------         ------                     CBC Auto Differential[[id number]]        Abnormal            Final result                 Please view results for these tests on the individual orders.    Comprehensive Metabolic Panel [[id number]]  (Abnormal) Collected: 11/09/24 1137    Specimen: Blood Updated: 11/09/24 1202     Glucose 128 mg/dL      BUN 14 mg/dL      Creatinine 0.85 mg/dL      Sodium 142 mmol/L      Potassium 3.9 mmol/L      Chloride 101 mmol/L      CO2 29.8 mmol/L      Calcium 9.6 mg/dL      Total Protein 7.1 g/dL      Albumin 4.6 g/dL      ALT (SGPT) 39 U/L      AST (SGOT) 36 U/L      Alkaline Phosphatase 102 U/L      Total Bilirubin 0.4 mg/dL      Globulin 2.5 gm/dL      A/G Ratio 1.8 g/dL      BUN/Creatinine Ratio 16.5     Anion Gap 11.2 mmol/L      eGFR 115.5 mL/min/1.73     Narrative:      GFR Normal >60  Chronic Kidney Disease <60  Kidney Failure <15      Acetaminophen Level [[id number]]  (Normal) Collected: 11/09/24 1137    Specimen: Blood Updated: 11/09/24 1202     Acetaminophen <5.0 mcg/mL     Ethanol [[id number]] Collected: 11/09/24 1137    Specimen: Blood Updated: 11/09/24 1202     Ethanol <10 mg/dL      Ethanol % <0.010 %     Narrative:      Ethanol (Plasma)  <10 Essentially Negative    Toxic Concentrations           mg/dL    Flushing, slowing of reflexes    Impaired visual activity         Depression of CNS              >100  Possible Coma                  >300       Salicylate Level [[id number]]  (Normal) Collected: 11/09/24 1137    Specimen: Blood  Updated: 11/09/24 1202     Salicylate <0.3 mg/dL     CBC Auto Differential [[id number]]  (Abnormal) Collected: 11/09/24 1137    Specimen: Blood Updated: 11/09/24 1144     WBC 15.06 10*3/mm3      RBC 4.46 10*6/mm3      Hemoglobin 14.2 g/dL      Hematocrit 43.3 %      MCV 97.1 fL      MCH 31.8 pg      MCHC 32.8 g/dL      RDW 13.6 %      RDW-SD 49.0 fl      MPV 9.3 fL      Platelets 220 10*3/mm3      Neutrophil % 82.7 %      Lymphocyte % 10.1 %      Monocyte % 4.7 %      Eosinophil % 0.2 %      Basophil % 0.3 %      Immature Grans % 2.0 %      Neutrophils, Absolute 12.46 10*3/mm3      Lymphocytes, Absolute 1.52 10*3/mm3      Monocytes, Absolute 0.71 10*3/mm3      Eosinophils, Absolute 0.03 10*3/mm3      Basophils, Absolute 0.04 10*3/mm3      Immature Grans, Absolute 0.30 10*3/mm3      nRBC 0.0 /100 WBC              Imaging:    No Radiology Exams Resulted Within Past 24 Hours      Differential Diagnosis and Discussion:    {Differentials:09823}    {Independent Review of (Optional):46598}    MDM         {CRITICAL CARE:89502}      {SEPSIS RECOGNITION:51026}    Patient Care Considerations:    {Considerations (Optional):20473}      Consultants/Shared Management Plan:    {Shared Management Plan (Optional):40610}    Social Determinants of Health:    {Social Determinants of Health (Optional):63308}      Disposition and Care Coordination:    {Admission consideration:03465}    {Discharge (Optional):87457}    Final diagnoses:   None        ED Disposition       None            This medical record created using voice recognition software.           Disposition   Discharge    Condition   Stable    Comment   --               This medical record created using voice recognition software.             Miguel Stark DO  11/14/24 0629

## 2024-11-10 LAB
QT INTERVAL: 418 MS
QTC INTERVAL: 437 MS